# Patient Record
Sex: FEMALE | Race: WHITE | Employment: OTHER | URBAN - METROPOLITAN AREA
[De-identification: names, ages, dates, MRNs, and addresses within clinical notes are randomized per-mention and may not be internally consistent; named-entity substitution may affect disease eponyms.]

---

## 2017-01-12 PROBLEM — G40.909 SEIZURE DISORDER (HCC): Status: ACTIVE | Noted: 2017-01-12

## 2017-02-01 ENCOUNTER — HOSPITAL ENCOUNTER (OUTPATIENT)
Dept: OTHER | Age: 52
Discharge: HOME | End: 2017-02-28
Attending: INTERNAL MEDICINE | Admitting: INTERNAL MEDICINE

## 2017-02-11 LAB — CLOSTRIDIUM DIFFICILE, PCR: NORMAL

## 2017-02-17 LAB
ALBUMIN SERPL-MCNC: 3.8 GM/DL (ref 3.4–5)
ALP BLD-CCNC: 91 IU/L (ref 40–128)
ALT SERPL-CCNC: 12 U/L (ref 10–40)
ANION GAP SERPL CALCULATED.3IONS-SCNC: 16 MMOL/L (ref 4–16)
AST SERPL-CCNC: 14 IU/L (ref 15–37)
BILIRUB SERPL-MCNC: 0.2 MG/DL (ref 0–1)
BUN BLDV-MCNC: 43 MG/DL (ref 6–23)
CALCIUM SERPL-MCNC: 9.5 MG/DL (ref 8.3–10.6)
CHLORIDE BLD-SCNC: 97 MMOL/L (ref 99–110)
CO2: 19 MMOL/L (ref 21–32)
CREAT SERPL-MCNC: 1.1 MG/DL (ref 0.6–1.1)
GFR AFRICAN AMERICAN: >60 ML/MIN/1.73M2
GFR NON-AFRICAN AMERICAN: 52 ML/MIN/1.73M2
GLUCOSE FASTING: 92 MG/DL (ref 70–99)
HCT VFR BLD CALC: 38.7 % (ref 37–47)
HEMOGLOBIN: 11.8 GM/DL (ref 12.5–16)
MCH RBC QN AUTO: 30.2 PG (ref 27–31)
MCHC RBC AUTO-ENTMCNC: 30.5 % (ref 32–36)
MCV RBC AUTO: 99 FL (ref 78–100)
PDW BLD-RTO: 15.9 % (ref 11.7–14.9)
PLATELET # BLD: 308 K/CU MM (ref 140–440)
PMV BLD AUTO: 9.5 FL (ref 7.5–11.1)
POTASSIUM SERPL-SCNC: 5.5 MMOL/L (ref 3.5–5.1)
RBC # BLD: 3.91 M/CU MM (ref 4.2–5.4)
SODIUM BLD-SCNC: 132 MMOL/L (ref 135–145)
TOTAL PROTEIN: 6.9 GM/DL (ref 6.4–8.2)
WBC # BLD: 11.6 K/CU MM (ref 4–10.5)

## 2017-02-21 LAB
ANION GAP SERPL CALCULATED.3IONS-SCNC: 20 MMOL/L (ref 4–16)
BUN BLDV-MCNC: 47 MG/DL (ref 6–23)
CALCIUM SERPL-MCNC: 9.1 MG/DL (ref 8.3–10.6)
CHLORIDE BLD-SCNC: 99 MMOL/L (ref 99–110)
CO2: 19 MMOL/L (ref 21–32)
CREAT SERPL-MCNC: 1.4 MG/DL (ref 0.6–1.1)
GFR AFRICAN AMERICAN: 48 ML/MIN/1.73M2
GFR NON-AFRICAN AMERICAN: 39 ML/MIN/1.73M2
GLUCOSE BLD-MCNC: 79 MG/DL (ref 70–140)
POTASSIUM SERPL-SCNC: 5 MMOL/L (ref 3.5–5.1)
SODIUM BLD-SCNC: 138 MMOL/L (ref 135–145)

## 2017-03-01 ENCOUNTER — HOSPITAL ENCOUNTER (OUTPATIENT)
Dept: OTHER | Age: 52
Discharge: HOME | End: 2017-03-31
Attending: INTERNAL MEDICINE | Admitting: INTERNAL MEDICINE

## 2017-03-13 LAB
ALBUMIN SERPL-MCNC: 3.8 GM/DL (ref 3.4–5)
ALP BLD-CCNC: 124 IU/L (ref 40–128)
ALT SERPL-CCNC: 41 U/L (ref 10–40)
ANION GAP SERPL CALCULATED.3IONS-SCNC: 14 MMOL/L (ref 4–16)
AST SERPL-CCNC: 15 IU/L (ref 15–37)
BILIRUB SERPL-MCNC: 0.2 MG/DL (ref 0–1)
BUN BLDV-MCNC: 19 MG/DL (ref 6–23)
CALCIUM SERPL-MCNC: 8.8 MG/DL (ref 8.3–10.6)
CHLORIDE BLD-SCNC: 101 MMOL/L (ref 99–110)
CO2: 22 MMOL/L (ref 21–32)
CREAT SERPL-MCNC: 0.9 MG/DL (ref 0.6–1.1)
GFR AFRICAN AMERICAN: >60 ML/MIN/1.73M2
GFR NON-AFRICAN AMERICAN: >60 ML/MIN/1.73M2
GLUCOSE BLD-MCNC: 87 MG/DL (ref 70–140)
HCT VFR BLD CALC: 33.4 % (ref 37–47)
HEMOGLOBIN: 10.4 GM/DL (ref 12.5–16)
MCH RBC QN AUTO: 30.6 PG (ref 27–31)
MCHC RBC AUTO-ENTMCNC: 31.1 % (ref 32–36)
MCV RBC AUTO: 98.2 FL (ref 78–100)
PDW BLD-RTO: 15.6 % (ref 11.7–14.9)
PLATELET # BLD: 310 K/CU MM (ref 140–440)
PMV BLD AUTO: 9.2 FL (ref 7.5–11.1)
POTASSIUM SERPL-SCNC: 4.2 MMOL/L (ref 3.5–5.1)
RBC # BLD: 3.4 M/CU MM (ref 4.2–5.4)
SODIUM BLD-SCNC: 137 MMOL/L (ref 135–145)
TOTAL PROTEIN: 6.2 GM/DL (ref 6.4–8.2)
WBC # BLD: 9.2 K/CU MM (ref 4–10.5)

## 2017-04-01 ENCOUNTER — HOSPITAL ENCOUNTER (OUTPATIENT)
Dept: OTHER | Age: 52
Discharge: HOME | End: 2017-04-30
Attending: INTERNAL MEDICINE | Admitting: INTERNAL MEDICINE

## 2017-04-20 LAB
CULTURE: NORMAL
ORGANISM: NORMAL
REPORT STATUS: NORMAL
REQUEST PROBLEM: NORMAL
SPECIMEN: NORMAL
TOTAL COLONY COUNT: NORMAL

## 2017-05-01 ENCOUNTER — HOSPITAL ENCOUNTER (OUTPATIENT)
Dept: OTHER | Age: 52
Discharge: HOME | End: 2017-05-31
Attending: INTERNAL MEDICINE | Admitting: INTERNAL MEDICINE

## 2017-05-12 LAB
ALBUMIN SERPL-MCNC: 3.9 GM/DL (ref 3.4–5)
ALP BLD-CCNC: 161 IU/L (ref 40–128)
ALT SERPL-CCNC: 14 U/L (ref 10–40)
ANION GAP SERPL CALCULATED.3IONS-SCNC: 16 MMOL/L (ref 4–16)
AST SERPL-CCNC: 17 IU/L (ref 15–37)
BILIRUB SERPL-MCNC: 0.2 MG/DL (ref 0–1)
BUN BLDV-MCNC: 27 MG/DL (ref 6–23)
CALCIUM SERPL-MCNC: 9.5 MG/DL (ref 8.3–10.6)
CHLORIDE BLD-SCNC: 102 MMOL/L (ref 99–110)
CO2: 21 MMOL/L (ref 21–32)
CREAT SERPL-MCNC: 1 MG/DL (ref 0.6–1.1)
ESTIMATED AVERAGE GLUCOSE: 100 MG/DL
GFR AFRICAN AMERICAN: >60 ML/MIN/1.73M2
GFR NON-AFRICAN AMERICAN: 58 ML/MIN/1.73M2
GLUCOSE BLD-MCNC: 110 MG/DL (ref 70–140)
HBA1C MFR BLD: 5.1 % (ref 4.2–6.3)
HCT VFR BLD CALC: 40.1 % (ref 37–47)
HEMOGLOBIN: 12 GM/DL (ref 12.5–16)
MCH RBC QN AUTO: 26.8 PG (ref 27–31)
MCHC RBC AUTO-ENTMCNC: 29.9 % (ref 32–36)
MCV RBC AUTO: 89.5 FL (ref 78–100)
PDW BLD-RTO: 14.4 % (ref 11.7–14.9)
PLATELET # BLD: 241 K/CU MM (ref 140–440)
PMV BLD AUTO: 10.3 FL (ref 7.5–11.1)
POTASSIUM SERPL-SCNC: 4.6 MMOL/L (ref 3.5–5.1)
RBC # BLD: 4.48 M/CU MM (ref 4.2–5.4)
SODIUM BLD-SCNC: 139 MMOL/L (ref 135–145)
TOTAL PROTEIN: 7 GM/DL (ref 6.4–8.2)
WBC # BLD: 8 K/CU MM (ref 4–10.5)

## 2017-05-19 LAB
ANION GAP SERPL CALCULATED.3IONS-SCNC: 17 MMOL/L (ref 4–16)
BUN BLDV-MCNC: 29 MG/DL (ref 6–23)
CALCIUM SERPL-MCNC: 9.4 MG/DL (ref 8.3–10.6)
CHLORIDE BLD-SCNC: 103 MMOL/L (ref 99–110)
CO2: 22 MMOL/L (ref 21–32)
CREAT SERPL-MCNC: 0.9 MG/DL (ref 0.6–1.1)
GFR AFRICAN AMERICAN: >60 ML/MIN/1.73M2
GFR NON-AFRICAN AMERICAN: >60 ML/MIN/1.73M2
GLUCOSE BLD-MCNC: 92 MG/DL (ref 70–140)
HEMOGLOBIN: 12.3 GM/DL (ref 12.5–16)
POTASSIUM SERPL-SCNC: 4.3 MMOL/L (ref 3.5–5.1)
SODIUM BLD-SCNC: 142 MMOL/L (ref 135–145)

## 2017-06-01 ENCOUNTER — HOSPITAL ENCOUNTER (OUTPATIENT)
Dept: OTHER | Age: 52
Discharge: HOME | End: 2017-06-30
Attending: INTERNAL MEDICINE | Admitting: INTERNAL MEDICINE

## 2017-06-06 LAB
BACTERIA: ABNORMAL /HPF
BILIRUBIN URINE: NEGATIVE MG/DL
BLOOD, URINE: ABNORMAL
CALCIUM OXALATE CRYSTALS: ABNORMAL /HPF
CLARITY: ABNORMAL
COLOR: ABNORMAL
GLUCOSE, URINE: NEGATIVE MG/DL
HYALINE CASTS: 5 /LPF
KETONES, URINE: NEGATIVE MG/DL
LEUKOCYTE ESTERASE, URINE: ABNORMAL
MUCUS: ABNORMAL HPF
NITRITE URINE, QUANTITATIVE: NEGATIVE
PH, URINE: 6 (ref 5–8)
PROTEIN UA: 30 MG/DL
RBC URINE: 33 /HPF (ref 0–6)
SPECIFIC GRAVITY UA: 1.02 (ref 1–1.03)
SQUAMOUS EPITHELIAL: 2 /HPF
UROBILINOGEN, URINE: NORMAL MG/DL (ref 0.2–1)
WBC CLUMP: ABNORMAL /HPF
WBC UA: 116 /HPF (ref 0–5)
YEAST: ABNORMAL /HPF

## 2017-06-09 LAB
BACTERIA: ABNORMAL /HPF
BILIRUBIN URINE: NEGATIVE MG/DL
BLOOD, URINE: ABNORMAL
CLARITY: CLEAR
COLOR: ABNORMAL
CULTURE: NORMAL
GLUCOSE, URINE: NEGATIVE MG/DL
HYALINE CASTS: 2 /LPF
KETONES, URINE: NEGATIVE MG/DL
LEUKOCYTE ESTERASE, URINE: ABNORMAL
MUCUS: ABNORMAL HPF
NITRITE URINE, QUANTITATIVE: NEGATIVE
PH, URINE: 5 (ref 5–8)
PROTEIN UA: NEGATIVE MG/DL
RBC URINE: 35 /HPF (ref 0–6)
REPORT STATUS: NORMAL
REQUEST PROBLEM: NORMAL
SPECIFIC GRAVITY UA: 1.01 (ref 1–1.03)
SPECIMEN: NORMAL
SQUAMOUS EPITHELIAL: <1 /HPF
TOTAL COLONY COUNT: NORMAL
UROBILINOGEN, URINE: NORMAL MG/DL (ref 0.2–1)
WBC UA: 15 /HPF (ref 0–5)

## 2017-06-13 ENCOUNTER — HOSPITAL ENCOUNTER (OUTPATIENT)
Dept: OTHER | Age: 52
Discharge: HOME | End: 2017-06-13
Attending: UROLOGY | Admitting: UROLOGY

## 2017-06-20 LAB
STONE COMPOSITION: NORMAL
STONE DESCRIPTION: NORMAL
STONE MASS: 7
STONE NUMBER: 2
STONE SIZE: NORMAL

## 2017-06-21 LAB
BACTERIA: ABNORMAL /HPF
BILIRUBIN URINE: NEGATIVE MG/DL
BLOOD, URINE: ABNORMAL
CLARITY: ABNORMAL
COLOR: YELLOW
GLUCOSE, URINE: NEGATIVE MG/DL
KETONES, URINE: NEGATIVE MG/DL
LEUKOCYTE ESTERASE, URINE: ABNORMAL
NITRITE URINE, QUANTITATIVE: POSITIVE
PH, URINE: 6 (ref 5–8)
PROTEIN UA: 100 MG/DL
RBC URINE: ABNORMAL /HPF (ref 0–6)
SPECIFIC GRAVITY UA: 1.02 (ref 1–1.03)
SQUAMOUS EPITHELIAL: 297 /HPF
UROBILINOGEN, URINE: NORMAL MG/DL (ref 0.2–1)
WBC CLUMP: ABNORMAL /HPF
WBC UA: 1320 /HPF (ref 0–5)

## 2017-06-26 LAB
ALBUMIN SERPL-MCNC: 3.5 GM/DL (ref 3.4–5)
ALP BLD-CCNC: 160 IU/L (ref 40–128)
ALT SERPL-CCNC: 14 U/L (ref 10–40)
ANION GAP SERPL CALCULATED.3IONS-SCNC: 15 MMOL/L (ref 4–16)
AST SERPL-CCNC: 15 IU/L (ref 15–37)
BILIRUB SERPL-MCNC: 0.2 MG/DL (ref 0–1)
BUN BLDV-MCNC: 25 MG/DL (ref 6–23)
CALCIUM SERPL-MCNC: 9.1 MG/DL (ref 8.3–10.6)
CHLORIDE BLD-SCNC: 100 MMOL/L (ref 99–110)
CO2: 23 MMOL/L (ref 21–32)
CREAT SERPL-MCNC: 0.9 MG/DL (ref 0.6–1.1)
GFR AFRICAN AMERICAN: >60 ML/MIN/1.73M2
GFR NON-AFRICAN AMERICAN: >60 ML/MIN/1.73M2
GLUCOSE BLD-MCNC: 88 MG/DL (ref 70–140)
HCT VFR BLD CALC: 37.9 % (ref 37–47)
HEMOGLOBIN: 11.6 GM/DL (ref 12.5–16)
MCH RBC QN AUTO: 26.6 PG (ref 27–31)
MCHC RBC AUTO-ENTMCNC: 30.6 % (ref 32–36)
MCV RBC AUTO: 86.9 FL (ref 78–100)
PDW BLD-RTO: 15.8 % (ref 11.7–14.9)
PLATELET # BLD: 203 K/CU MM (ref 140–440)
PMV BLD AUTO: 10.6 FL (ref 7.5–11.1)
POTASSIUM SERPL-SCNC: 4.3 MMOL/L (ref 3.5–5.1)
RBC # BLD: 4.36 M/CU MM (ref 4.2–5.4)
SODIUM BLD-SCNC: 138 MMOL/L (ref 135–145)
TOTAL PROTEIN: 6.5 GM/DL (ref 6.4–8.2)
WBC # BLD: 7.8 K/CU MM (ref 4–10.5)

## 2017-07-01 ENCOUNTER — HOSPITAL ENCOUNTER (OUTPATIENT)
Dept: OTHER | Age: 52
Discharge: HOME | End: 2017-07-31
Attending: INTERNAL MEDICINE | Admitting: INTERNAL MEDICINE

## 2017-07-07 LAB
BACTERIA: ABNORMAL /HPF
BILIRUBIN URINE: NEGATIVE MG/DL
BLOOD, URINE: ABNORMAL
CLARITY: ABNORMAL
COLOR: YELLOW
GLUCOSE, URINE: NEGATIVE MG/DL
KETONES, URINE: NEGATIVE MG/DL
LEUKOCYTE ESTERASE, URINE: ABNORMAL
NITRITE URINE, QUANTITATIVE: POSITIVE
PH, URINE: 6 (ref 5–8)
PROTEIN UA: 30 MG/DL
RBC URINE: ABNORMAL /HPF (ref 0–6)
SPECIFIC GRAVITY UA: 1.02 (ref 1–1.03)
SQUAMOUS EPITHELIAL: 1 /HPF
TRANSITIONAL EPITHELIAL: <1 /HPF
UROBILINOGEN, URINE: NORMAL MG/DL (ref 0.2–1)
WBC CLUMP: ABNORMAL /HPF
WBC UA: 330 /HPF (ref 0–5)

## 2017-07-09 LAB
CULTURE: NORMAL
ORGANISM: NORMAL
ORGANISM: NORMAL
REPORT STATUS: NORMAL
REQUEST PROBLEM: NORMAL
SPECIMEN: NORMAL
TOTAL COLONY COUNT: NORMAL

## 2017-08-01 ENCOUNTER — HOSPITAL ENCOUNTER (OUTPATIENT)
Dept: OTHER | Age: 52
Discharge: HOME | End: 2017-08-31
Attending: INTERNAL MEDICINE | Admitting: INTERNAL MEDICINE

## 2017-08-04 LAB
ANION GAP SERPL CALCULATED.3IONS-SCNC: 15 MMOL/L (ref 4–16)
BUN BLDV-MCNC: 27 MG/DL (ref 6–23)
CALCIUM SERPL-MCNC: 9.2 MG/DL (ref 8.3–10.6)
CHLORIDE BLD-SCNC: 98 MMOL/L (ref 99–110)
CO2: 23 MMOL/L (ref 21–32)
CREAT SERPL-MCNC: 0.9 MG/DL (ref 0.6–1.1)
GFR AFRICAN AMERICAN: >60 ML/MIN/1.73M2
GFR NON-AFRICAN AMERICAN: >60 ML/MIN/1.73M2
GLUCOSE BLD-MCNC: 91 MG/DL (ref 70–140)
HCT VFR BLD CALC: 40.8 % (ref 37–47)
HEMOGLOBIN: 12.7 GM/DL (ref 12.5–16)
MCH RBC QN AUTO: 26.7 PG (ref 27–31)
MCHC RBC AUTO-ENTMCNC: 31.1 % (ref 32–36)
MCV RBC AUTO: 85.7 FL (ref 78–100)
PDW BLD-RTO: 16.7 % (ref 11.7–14.9)
PLATELET # BLD: 243 K/CU MM (ref 140–440)
PMV BLD AUTO: 9.6 FL (ref 7.5–11.1)
POTASSIUM SERPL-SCNC: 4.4 MMOL/L (ref 3.5–5.1)
RBC # BLD: 4.76 M/CU MM (ref 4.2–5.4)
SODIUM BLD-SCNC: 136 MMOL/L (ref 135–145)
WBC # BLD: 8.4 K/CU MM (ref 4–10.5)

## 2017-09-01 ENCOUNTER — HOSPITAL ENCOUNTER (OUTPATIENT)
Dept: OTHER | Age: 52
Discharge: HOME | End: 2017-09-30
Attending: INTERNAL MEDICINE | Admitting: INTERNAL MEDICINE

## 2017-10-25 ENCOUNTER — HOSPITAL ENCOUNTER (OUTPATIENT)
Dept: ULTRASOUND IMAGING | Age: 52
Discharge: HOME | End: 2017-10-25
Attending: UROLOGY | Admitting: UROLOGY

## 2017-10-25 DIAGNOSIS — N20.0 CALCULUS OF KIDNEY: ICD-10-CM

## 2018-01-01 ENCOUNTER — HOSPITAL ENCOUNTER (OUTPATIENT)
Dept: OTHER | Age: 53
Discharge: HOME | End: 2018-01-31
Attending: INTERNAL MEDICINE | Admitting: INTERNAL MEDICINE

## 2018-01-01 ENCOUNTER — HOSPITAL ENCOUNTER (OUTPATIENT)
Age: 53
Setting detail: SPECIMEN
Discharge: HOME | End: 2018-11-26
Payer: MEDICAID

## 2018-01-01 ENCOUNTER — HOSPITAL ENCOUNTER (OUTPATIENT)
Age: 53
Setting detail: SPECIMEN
Discharge: HOME | End: 2018-11-14
Payer: MEDICAID

## 2018-01-01 ENCOUNTER — HOSPITAL ENCOUNTER (OUTPATIENT)
Age: 53
Setting detail: SPECIMEN
Discharge: HOME | End: 2018-11-15
Payer: MEDICAID

## 2018-01-01 ENCOUNTER — HOSPITAL ENCOUNTER (OUTPATIENT)
Age: 53
Setting detail: SPECIMEN
Discharge: HOME | End: 2018-12-03
Payer: MEDICAID

## 2018-01-01 ENCOUNTER — HOSPITAL ENCOUNTER (OUTPATIENT)
Age: 53
Setting detail: SPECIMEN
Discharge: HOME | End: 2018-12-11
Payer: MEDICAID

## 2018-01-01 ENCOUNTER — HOSPITAL ENCOUNTER (OUTPATIENT)
Age: 53
Setting detail: SPECIMEN
Discharge: HOME | End: 2018-11-12
Payer: MEDICAID

## 2018-01-01 LAB
ALBUMIN SERPL-MCNC: 3 GM/DL (ref 3.4–5)
ALBUMIN SERPL-MCNC: 3.6 GM/DL (ref 3.4–5)
ALP BLD-CCNC: 208 IU/L (ref 40–128)
ALP BLD-CCNC: 355 IU/L (ref 40–128)
ALT SERPL-CCNC: 158 U/L (ref 10–40)
ALT SERPL-CCNC: 16 U/L (ref 10–40)
ANION GAP SERPL CALCULATED.3IONS-SCNC: 13 MMOL/L (ref 4–16)
ANION GAP SERPL CALCULATED.3IONS-SCNC: 17 MMOL/L (ref 4–16)
ANION GAP SERPL CALCULATED.3IONS-SCNC: 17 MMOL/L (ref 4–16)
AST SERPL-CCNC: 136 IU/L (ref 15–37)
AST SERPL-CCNC: 16 IU/L (ref 15–37)
BILIRUB SERPL-MCNC: 0.2 MG/DL (ref 0–1)
BILIRUB SERPL-MCNC: 0.2 MG/DL (ref 0–1)
BUN BLDV-MCNC: 18 MG/DL (ref 6–23)
BUN BLDV-MCNC: 20 MG/DL (ref 6–23)
BUN BLDV-MCNC: 33 MG/DL (ref 6–23)
CALCIUM SERPL-MCNC: 8.7 MG/DL (ref 8.3–10.6)
CALCIUM SERPL-MCNC: 8.9 MG/DL (ref 8.3–10.6)
CALCIUM SERPL-MCNC: 9.1 MG/DL (ref 8.3–10.6)
CHLORIDE BLD-SCNC: 101 MMOL/L (ref 99–110)
CHLORIDE BLD-SCNC: 99 MMOL/L (ref 99–110)
CHLORIDE BLD-SCNC: 99 MMOL/L (ref 99–110)
CO2: 18 MMOL/L (ref 21–32)
CO2: 20 MMOL/L (ref 21–32)
CO2: 24 MMOL/L (ref 21–32)
CREAT SERPL-MCNC: 0.8 MG/DL (ref 0.6–1.1)
CREAT SERPL-MCNC: 0.8 MG/DL (ref 0.6–1.1)
CREAT SERPL-MCNC: 1 MG/DL (ref 0.6–1.1)
ESTIMATED AVERAGE GLUCOSE: 111 MG/DL
GFR AFRICAN AMERICAN: >60 ML/MIN/1.73M2
GFR NON-AFRICAN AMERICAN: 58 ML/MIN/1.73M2
GFR NON-AFRICAN AMERICAN: >60 ML/MIN/1.73M2
GFR NON-AFRICAN AMERICAN: >60 ML/MIN/1.73M2
GLUCOSE BLD-MCNC: 86 MG/DL (ref 70–99)
GLUCOSE BLD-MCNC: 89 MG/DL (ref 70–99)
GLUCOSE BLD-MCNC: 99 MG/DL (ref 70–99)
HBA1C MFR BLD: 5.5 % (ref 4.2–6.3)
HCT VFR BLD CALC: 36.5 % (ref 37–47)
HCT VFR BLD CALC: 39 % (ref 37–47)
HEMOCCULT SP1 STL QL: NEGATIVE
HEMOGLOBIN: 10.5 GM/DL (ref 12.5–16)
HEMOGLOBIN: 10.8 GM/DL (ref 12.5–16)
MCH RBC QN AUTO: 25.7 PG (ref 27–31)
MCH RBC QN AUTO: 25.7 PG (ref 27–31)
MCHC RBC AUTO-ENTMCNC: 27.7 % (ref 32–36)
MCHC RBC AUTO-ENTMCNC: 28.8 % (ref 32–36)
MCV RBC AUTO: 89.5 FL (ref 78–100)
MCV RBC AUTO: 92.9 FL (ref 78–100)
OCCULT BLOOD 2: NEGATIVE
PDW BLD-RTO: 16.3 % (ref 11.7–14.9)
PDW BLD-RTO: 16.8 % (ref 11.7–14.9)
PLATELET # BLD: 397 K/CU MM (ref 140–440)
PLATELET # BLD: 476 K/CU MM (ref 140–440)
PMV BLD AUTO: 8.6 FL (ref 7.5–11.1)
PMV BLD AUTO: 9 FL (ref 7.5–11.1)
POTASSIUM SERPL-SCNC: 3.9 MMOL/L (ref 3.5–5.1)
POTASSIUM SERPL-SCNC: 4.3 MMOL/L (ref 3.5–5.1)
POTASSIUM SERPL-SCNC: 4.4 MMOL/L (ref 3.5–5.1)
RBC # BLD: 4.08 M/CU MM (ref 4.2–5.4)
RBC # BLD: 4.2 M/CU MM (ref 4.2–5.4)
SODIUM BLD-SCNC: 136 MMOL/L (ref 135–145)
TOTAL PROTEIN: 7.2 GM/DL (ref 6.4–8.2)
TOTAL PROTEIN: 7.5 GM/DL (ref 6.4–8.2)
WBC # BLD: 10.9 K/CU MM (ref 4–10.5)
WBC # BLD: 9.2 K/CU MM (ref 4–10.5)

## 2018-01-01 PROCEDURE — 36415 COLL VENOUS BLD VENIPUNCTURE: CPT

## 2018-01-01 PROCEDURE — 85027 COMPLETE CBC AUTOMATED: CPT

## 2018-01-01 PROCEDURE — G0328 FECAL BLOOD SCRN IMMUNOASSAY: HCPCS

## 2018-01-01 PROCEDURE — 83036 HEMOGLOBIN GLYCOSYLATED A1C: CPT

## 2018-01-01 PROCEDURE — 80053 COMPREHEN METABOLIC PANEL: CPT

## 2018-01-01 PROCEDURE — 80048 BASIC METABOLIC PNL TOTAL CA: CPT

## 2018-01-05 LAB
RAPID INFLUENZA  B AGN: NEGATIVE
RAPID INFLUENZA A AGN: NEGATIVE

## 2018-02-01 ENCOUNTER — HOSPITAL ENCOUNTER (OUTPATIENT)
Dept: OTHER | Age: 53
Discharge: HOME | End: 2018-02-28
Attending: INTERNAL MEDICINE | Admitting: INTERNAL MEDICINE

## 2018-02-26 LAB
ALBUMIN SERPL-MCNC: 4 GM/DL (ref 3.4–5)
ALP BLD-CCNC: 206 IU/L (ref 40–128)
ALT SERPL-CCNC: 7 U/L (ref 10–40)
ANION GAP SERPL CALCULATED.3IONS-SCNC: 17 MMOL/L (ref 4–16)
AST SERPL-CCNC: 12 IU/L (ref 15–37)
BILIRUB SERPL-MCNC: 0.2 MG/DL (ref 0–1)
BUN BLDV-MCNC: 18 MG/DL (ref 6–23)
CALCIUM SERPL-MCNC: 8.8 MG/DL (ref 8.3–10.6)
CHLORIDE BLD-SCNC: 100 MMOL/L (ref 99–110)
CO2: 21 MMOL/L (ref 21–32)
CREAT SERPL-MCNC: 0.9 MG/DL (ref 0.6–1.1)
ESTIMATED AVERAGE GLUCOSE: 103 MG/DL
GFR AFRICAN AMERICAN: >60 ML/MIN/1.73M2
GFR NON-AFRICAN AMERICAN: >60 ML/MIN/1.73M2
GLUCOSE BLD-MCNC: 82 MG/DL (ref 70–99)
HBA1C MFR BLD: 5.2 % (ref 4.2–6.3)
POTASSIUM SERPL-SCNC: 4.4 MMOL/L (ref 3.5–5.1)
SODIUM BLD-SCNC: 138 MMOL/L (ref 135–145)
TOTAL PROTEIN: 7.5 GM/DL (ref 6.4–8.2)

## 2018-03-01 ENCOUNTER — HOSPITAL ENCOUNTER (OUTPATIENT)
Dept: OTHER | Age: 53
Discharge: HOME | End: 2018-03-31
Attending: INTERNAL MEDICINE | Admitting: INTERNAL MEDICINE

## 2018-05-01 ENCOUNTER — HOSPITAL ENCOUNTER (OUTPATIENT)
Dept: OTHER | Age: 53
Discharge: HOME | End: 2018-05-31
Attending: INTERNAL MEDICINE | Admitting: INTERNAL MEDICINE

## 2018-05-07 ENCOUNTER — HOSPITAL ENCOUNTER (OUTPATIENT)
Dept: GENERAL RADIOLOGY | Age: 53
Discharge: HOME | End: 2018-05-07
Attending: INTERNAL MEDICINE | Admitting: INTERNAL MEDICINE

## 2018-05-07 DIAGNOSIS — R10.811 RIGHT UPPER QUADRANT ABDOMINAL TENDERNESS, REBOUND TENDERNESS PRESENCE NOT SPECIFIED: ICD-10-CM

## 2018-05-11 LAB
ALBUMIN SERPL-MCNC: 3.8 GM/DL (ref 3.4–5)
ALP BLD-CCNC: 217 IU/L (ref 40–128)
ALT SERPL-CCNC: 19 U/L (ref 10–40)
ANION GAP SERPL CALCULATED.3IONS-SCNC: 15 MMOL/L (ref 4–16)
AST SERPL-CCNC: 13 IU/L (ref 15–37)
BILIRUB SERPL-MCNC: 0.2 MG/DL (ref 0–1)
BUN BLDV-MCNC: 16 MG/DL (ref 6–23)
CALCIUM SERPL-MCNC: 9.2 MG/DL (ref 8.3–10.6)
CHLORIDE BLD-SCNC: 101 MMOL/L (ref 99–110)
CO2: 21 MMOL/L (ref 21–32)
CREAT SERPL-MCNC: 0.9 MG/DL (ref 0.6–1.1)
GFR AFRICAN AMERICAN: >60 ML/MIN/1.73M2
GFR NON-AFRICAN AMERICAN: >60 ML/MIN/1.73M2
GLUCOSE BLD-MCNC: 92 MG/DL (ref 70–99)
HCT VFR BLD CALC: 39.8 % (ref 37–47)
HEMOGLOBIN: 12.5 GM/DL (ref 12.5–16)
MCH RBC QN AUTO: 27.7 PG (ref 27–31)
MCHC RBC AUTO-ENTMCNC: 31.4 % (ref 32–36)
MCV RBC AUTO: 88.2 FL (ref 78–100)
PDW BLD-RTO: 17.2 % (ref 11.7–14.9)
PLATELET # BLD: 275 K/CU MM (ref 140–440)
PMV BLD AUTO: 9.5 FL (ref 7.5–11.1)
POTASSIUM SERPL-SCNC: 4.2 MMOL/L (ref 3.5–5.1)
RBC # BLD: 4.51 M/CU MM (ref 4.2–5.4)
SODIUM BLD-SCNC: 137 MMOL/L (ref 135–145)
TOTAL PROTEIN: 7.4 GM/DL (ref 6.4–8.2)
WBC # BLD: 8.5 K/CU MM (ref 4–10.5)

## 2018-06-01 ENCOUNTER — HOSPITAL ENCOUNTER (OUTPATIENT)
Dept: OTHER | Age: 53
Discharge: HOME | End: 2018-06-30
Attending: INTERNAL MEDICINE | Admitting: INTERNAL MEDICINE

## 2018-06-18 LAB
CHOLESTEROL: 125 MG/DL
HDLC SERPL-MCNC: 36 MG/DL
LDL CHOLESTEROL CALCULATED: 56 MG/DL
TRIGL SERPL-MCNC: 167 MG/DL

## 2018-07-01 ENCOUNTER — HOSPITAL ENCOUNTER (OUTPATIENT)
Dept: OTHER | Age: 53
Discharge: HOME | End: 2018-07-31
Attending: INTERNAL MEDICINE | Admitting: INTERNAL MEDICINE

## 2019-01-01 ENCOUNTER — APPOINTMENT (OUTPATIENT)
Dept: CT IMAGING | Age: 54
DRG: 663 | End: 2019-01-01
Payer: MEDICAID

## 2019-01-01 ENCOUNTER — HOSPITAL ENCOUNTER (OUTPATIENT)
Age: 54
Setting detail: SPECIMEN
Discharge: HOME | End: 2019-06-24
Payer: MEDICAID

## 2019-01-01 ENCOUNTER — HOSPITAL ENCOUNTER (INPATIENT)
Age: 54
LOS: 10 days | DRG: 343 | End: 2019-10-10
Attending: FAMILY MEDICINE | Admitting: FAMILY MEDICINE
Payer: COMMERCIAL

## 2019-01-01 ENCOUNTER — APPOINTMENT (OUTPATIENT)
Dept: CT IMAGING | Age: 54
DRG: 720 | End: 2019-01-01
Payer: MEDICAID

## 2019-01-01 ENCOUNTER — HOSPITAL ENCOUNTER (INPATIENT)
Age: 54
LOS: 4 days | Discharge: NURSING, REHAB OR LONG-TERM CARE | DRG: 082 | End: 2019-04-30
Attending: EMERGENCY MEDICINE | Admitting: INTERNAL MEDICINE
Payer: MEDICAID

## 2019-01-01 ENCOUNTER — HOSPITAL ENCOUNTER (OUTPATIENT)
Age: 54
Discharge: HOME | End: 2019-05-15
Payer: MEDICAID

## 2019-01-01 ENCOUNTER — HOSPITAL ENCOUNTER (OUTPATIENT)
Age: 54
Setting detail: SPECIMEN
Discharge: HOME | End: 2019-05-17
Payer: MEDICAID

## 2019-01-01 ENCOUNTER — HOSPITAL ENCOUNTER (OUTPATIENT)
Age: 54
Setting detail: SPECIMEN
Discharge: HOME | End: 2019-06-06
Payer: MEDICAID

## 2019-01-01 ENCOUNTER — HOSPITAL ENCOUNTER (OUTPATIENT)
Age: 54
Setting detail: SPECIMEN
Discharge: HOME | End: 2019-01-08
Payer: MEDICAID

## 2019-01-01 ENCOUNTER — APPOINTMENT (OUTPATIENT)
Dept: ULTRASOUND IMAGING | Age: 54
DRG: 663 | End: 2019-01-01
Payer: MEDICAID

## 2019-01-01 ENCOUNTER — HOSPITAL ENCOUNTER (INPATIENT)
Age: 54
LOS: 2 days | Discharge: NURSING, REHAB OR LONG-TERM CARE | DRG: 720 | End: 2019-03-29
Attending: EMERGENCY MEDICINE | Admitting: INTERNAL MEDICINE
Payer: MEDICAID

## 2019-01-01 ENCOUNTER — HOSPITAL ENCOUNTER (OUTPATIENT)
Age: 54
Setting detail: SPECIMEN
Discharge: HOME | End: 2019-08-05
Payer: MEDICAID

## 2019-01-01 ENCOUNTER — APPOINTMENT (OUTPATIENT)
Dept: CT IMAGING | Age: 54
DRG: 082 | End: 2019-01-01
Payer: MEDICAID

## 2019-01-01 ENCOUNTER — HOSPITAL ENCOUNTER (OUTPATIENT)
Age: 54
Setting detail: SPECIMEN
Discharge: HOME | End: 2019-08-29
Payer: MEDICAID

## 2019-01-01 ENCOUNTER — HOSPITAL ENCOUNTER (OUTPATIENT)
Age: 54
Setting detail: SPECIMEN
Discharge: HOME | End: 2019-04-02
Payer: MEDICAID

## 2019-01-01 ENCOUNTER — HOSPITAL ENCOUNTER (OUTPATIENT)
Age: 54
Setting detail: SPECIMEN
Discharge: HOME | End: 2019-05-07
Payer: MEDICAID

## 2019-01-01 ENCOUNTER — HOSPITAL ENCOUNTER (OUTPATIENT)
Age: 54
Setting detail: SPECIMEN
Discharge: HOME | End: 2019-06-05
Payer: MEDICAID

## 2019-01-01 ENCOUNTER — APPOINTMENT (OUTPATIENT)
Dept: GENERAL RADIOLOGY | Age: 54
DRG: 720 | End: 2019-01-01
Payer: MEDICAID

## 2019-01-01 ENCOUNTER — HOSPITAL ENCOUNTER (OUTPATIENT)
Age: 54
Setting detail: SPECIMEN
Discharge: HOME | End: 2019-06-12
Payer: MEDICAID

## 2019-01-01 ENCOUNTER — HOSPITAL ENCOUNTER (OUTPATIENT)
Age: 54
Setting detail: SPECIMEN
Discharge: HOME | End: 2019-05-20
Payer: MEDICAID

## 2019-01-01 ENCOUNTER — HOSPITAL ENCOUNTER (OUTPATIENT)
Age: 54
Setting detail: SPECIMEN
Discharge: HOME | End: 2019-05-29
Payer: MEDICAID

## 2019-01-01 ENCOUNTER — HOSPITAL ENCOUNTER (OUTPATIENT)
Age: 54
Discharge: HOME | End: 2019-06-28
Payer: MEDICAID

## 2019-01-01 ENCOUNTER — HOSPITAL ENCOUNTER (OUTPATIENT)
Age: 54
Setting detail: SPECIMEN
Discharge: HOME | End: 2019-02-11
Payer: MEDICAID

## 2019-01-01 ENCOUNTER — HOSPITAL ENCOUNTER (OUTPATIENT)
Age: 54
Setting detail: SPECIMEN
Discharge: HOME | End: 2019-08-06
Payer: MEDICAID

## 2019-01-01 ENCOUNTER — HOSPITAL ENCOUNTER (INPATIENT)
Age: 54
LOS: 3 days | Discharge: HOSPICE | DRG: 663 | End: 2019-09-30
Attending: EMERGENCY MEDICINE | Admitting: STUDENT IN AN ORGANIZED HEALTH CARE EDUCATION/TRAINING PROGRAM
Payer: MEDICAID

## 2019-01-01 ENCOUNTER — OUTSIDE SERVICES (OUTPATIENT)
Dept: WOUND CARE | Age: 54
End: 2019-01-01
Payer: MEDICAID

## 2019-01-01 ENCOUNTER — HOSPITAL ENCOUNTER (OUTPATIENT)
Age: 54
Setting detail: SPECIMEN
Discharge: HOME | End: 2019-06-18
Payer: MEDICAID

## 2019-01-01 ENCOUNTER — HOSPITAL ENCOUNTER (OUTPATIENT)
Age: 54
Setting detail: SPECIMEN
Discharge: HOME | End: 2019-05-03
Payer: MEDICAID

## 2019-01-01 ENCOUNTER — HOSPITAL ENCOUNTER (OUTPATIENT)
Age: 54
Setting detail: SPECIMEN
Discharge: HOME | End: 2019-06-17
Payer: MEDICAID

## 2019-01-01 VITALS
DIASTOLIC BLOOD PRESSURE: 77 MMHG | WEIGHT: 157.6 LBS | TEMPERATURE: 99.8 F | HEART RATE: 155 BPM | HEIGHT: 60 IN | RESPIRATION RATE: 14 BRPM | SYSTOLIC BLOOD PRESSURE: 132 MMHG | BODY MASS INDEX: 30.94 KG/M2 | OXYGEN SATURATION: 90 %

## 2019-01-01 VITALS
HEART RATE: 83 BPM | WEIGHT: 176 LBS | BODY MASS INDEX: 34.55 KG/M2 | OXYGEN SATURATION: 96 % | RESPIRATION RATE: 16 BRPM | TEMPERATURE: 98.2 F | HEIGHT: 60 IN | DIASTOLIC BLOOD PRESSURE: 73 MMHG | SYSTOLIC BLOOD PRESSURE: 126 MMHG

## 2019-01-01 VITALS
BODY MASS INDEX: 33.12 KG/M2 | TEMPERATURE: 99.1 F | OXYGEN SATURATION: 98 % | HEIGHT: 60 IN | HEART RATE: 82 BPM | RESPIRATION RATE: 18 BRPM | WEIGHT: 168.7 LBS | DIASTOLIC BLOOD PRESSURE: 94 MMHG | SYSTOLIC BLOOD PRESSURE: 138 MMHG

## 2019-01-01 VITALS
RESPIRATION RATE: 12 BRPM | OXYGEN SATURATION: 96 % | HEART RATE: 100 BPM | SYSTOLIC BLOOD PRESSURE: 108 MMHG | WEIGHT: 160.27 LBS | DIASTOLIC BLOOD PRESSURE: 69 MMHG | BODY MASS INDEX: 31.47 KG/M2 | HEIGHT: 60 IN | TEMPERATURE: 97.9 F

## 2019-01-01 DIAGNOSIS — R00.0 TACHYCARDIA: ICD-10-CM

## 2019-01-01 DIAGNOSIS — L03.211 CELLULITIS OF FACE: ICD-10-CM

## 2019-01-01 DIAGNOSIS — B02.30 HERPES ZOSTER WITH OPHTHALMIC COMPLICATION, UNSPECIFIED HERPES ZOSTER EYE DISEASE: Primary | ICD-10-CM

## 2019-01-01 DIAGNOSIS — D72.829 LEUKOCYTOSIS, UNSPECIFIED TYPE: ICD-10-CM

## 2019-01-01 DIAGNOSIS — N39.0 SEPSIS DUE TO URINARY TRACT INFECTION (HCC): Primary | ICD-10-CM

## 2019-01-01 DIAGNOSIS — A41.9 SEPSIS DUE TO URINARY TRACT INFECTION (HCC): Primary | ICD-10-CM

## 2019-01-01 DIAGNOSIS — D64.9 ANEMIA, UNSPECIFIED TYPE: Primary | ICD-10-CM

## 2019-01-01 DIAGNOSIS — M79.605 BILATERAL LEG PAIN: ICD-10-CM

## 2019-01-01 DIAGNOSIS — R52 BODY ACHES: ICD-10-CM

## 2019-01-01 DIAGNOSIS — L03.213 PRESEPTAL CELLULITIS: ICD-10-CM

## 2019-01-01 DIAGNOSIS — B36.9 DERMATITIS FUNGAL: ICD-10-CM

## 2019-01-01 DIAGNOSIS — M79.604 BILATERAL LEG PAIN: ICD-10-CM

## 2019-01-01 DIAGNOSIS — B35.4 TINEA OF THE BODY: Primary | ICD-10-CM

## 2019-01-01 DIAGNOSIS — E87.6 HYPOKALEMIA: ICD-10-CM

## 2019-01-01 LAB
ABO/RH: NORMAL
ALBUMIN SERPL-MCNC: 2.1 GM/DL (ref 3.4–5)
ALBUMIN SERPL-MCNC: 2.5 GM/DL (ref 3.4–5)
ALBUMIN SERPL-MCNC: 3.2 GM/DL (ref 3.4–5)
ALBUMIN SERPL-MCNC: 3.2 GM/DL (ref 3.4–5)
ALBUMIN SERPL-MCNC: 3.3 GM/DL (ref 3.4–5)
ALBUMIN SERPL-MCNC: 3.4 GM/DL (ref 3.4–5)
ALBUMIN SERPL-MCNC: 3.7 GM/DL (ref 3.4–5)
ALP BLD-CCNC: 145 IU/L (ref 40–128)
ALP BLD-CCNC: 171 IU/L (ref 40–128)
ALP BLD-CCNC: 175 IU/L (ref 40–129)
ALP BLD-CCNC: 192 IU/L (ref 40–128)
ALP BLD-CCNC: 192 IU/L (ref 40–129)
ALP BLD-CCNC: 211 IU/L (ref 40–128)
ALP BLD-CCNC: 260 IU/L (ref 40–128)
ALP BLD-CCNC: 294 IU/L (ref 40–129)
ALP BLD-CCNC: 473 IU/L (ref 40–128)
ALT SERPL-CCNC: 11 U/L (ref 10–40)
ALT SERPL-CCNC: 12 U/L (ref 10–40)
ALT SERPL-CCNC: 16 U/L (ref 10–40)
ALT SERPL-CCNC: 18 U/L (ref 10–40)
ALT SERPL-CCNC: 21 U/L (ref 10–40)
ALT SERPL-CCNC: 28 U/L (ref 10–40)
ALT SERPL-CCNC: 58 U/L (ref 10–40)
ALT SERPL-CCNC: 6 U/L (ref 10–40)
ALT SERPL-CCNC: 6 U/L (ref 10–40)
AMMONIA: 32 UMOL/L (ref 11–51)
AMYLASE: 43 U/L (ref 25–115)
ANION GAP SERPL CALCULATED.3IONS-SCNC: 10 MMOL/L (ref 4–16)
ANION GAP SERPL CALCULATED.3IONS-SCNC: 11 MMOL/L (ref 4–16)
ANION GAP SERPL CALCULATED.3IONS-SCNC: 12 MMOL/L (ref 4–16)
ANION GAP SERPL CALCULATED.3IONS-SCNC: 13 MMOL/L (ref 4–16)
ANION GAP SERPL CALCULATED.3IONS-SCNC: 14 MMOL/L (ref 4–16)
ANION GAP SERPL CALCULATED.3IONS-SCNC: 15 MMOL/L (ref 4–16)
ANION GAP SERPL CALCULATED.3IONS-SCNC: 17 MMOL/L (ref 4–16)
ANION GAP SERPL CALCULATED.3IONS-SCNC: 18 MMOL/L (ref 4–16)
ANION GAP SERPL CALCULATED.3IONS-SCNC: 21 MMOL/L (ref 4–16)
ANION GAP SERPL CALCULATED.3IONS-SCNC: 27 MMOL/L (ref 4–16)
ANTI-NUCLEAR ANTIBODY (ANA): NORMAL
ANTIBODY SCREEN: NEGATIVE
AST SERPL-CCNC: 11 IU/L (ref 15–37)
AST SERPL-CCNC: 13 IU/L (ref 15–37)
AST SERPL-CCNC: 15 IU/L (ref 15–37)
AST SERPL-CCNC: 15 IU/L (ref 15–37)
AST SERPL-CCNC: 18 IU/L (ref 15–37)
AST SERPL-CCNC: 22 IU/L (ref 15–37)
AST SERPL-CCNC: 25 IU/L (ref 15–37)
AST SERPL-CCNC: 31 IU/L (ref 15–37)
AST SERPL-CCNC: 42 IU/L (ref 15–37)
BACTERIA: ABNORMAL /HPF
BACTERIA: ABNORMAL /HPF
BACTERIA: NEGATIVE /HPF
BASOPHILS ABSOLUTE: 0 K/CU MM
BASOPHILS RELATIVE PERCENT: 0.1 % (ref 0–1)
BASOPHILS RELATIVE PERCENT: 0.2 % (ref 0–1)
BASOPHILS RELATIVE PERCENT: 0.3 % (ref 0–1)
BILIRUB SERPL-MCNC: 0.2 MG/DL (ref 0–1)
BILIRUB SERPL-MCNC: 0.3 MG/DL (ref 0–1)
BILIRUBIN URINE: NEGATIVE MG/DL
BLOOD, URINE: ABNORMAL
BUN BLDV-MCNC: 11 MG/DL (ref 6–23)
BUN BLDV-MCNC: 12 MG/DL (ref 6–23)
BUN BLDV-MCNC: 14 MG/DL (ref 6–23)
BUN BLDV-MCNC: 14 MG/DL (ref 6–23)
BUN BLDV-MCNC: 15 MG/DL (ref 6–23)
BUN BLDV-MCNC: 15 MG/DL (ref 6–23)
BUN BLDV-MCNC: 16 MG/DL (ref 6–23)
BUN BLDV-MCNC: 17 MG/DL (ref 6–23)
BUN BLDV-MCNC: 18 MG/DL (ref 6–23)
BUN BLDV-MCNC: 18 MG/DL (ref 6–23)
BUN BLDV-MCNC: 19 MG/DL (ref 6–23)
BUN BLDV-MCNC: 21 MG/DL (ref 6–23)
BUN BLDV-MCNC: 21 MG/DL (ref 6–23)
BUN BLDV-MCNC: 24 MG/DL (ref 6–23)
BUN BLDV-MCNC: 24 MG/DL (ref 6–23)
BUN BLDV-MCNC: 25 MG/DL (ref 6–23)
BUN BLDV-MCNC: 27 MG/DL (ref 6–23)
BUN BLDV-MCNC: 28 MG/DL (ref 6–23)
C-REACTIVE PROTEIN, HIGH SENSITIVITY: 156.2 MG/L
C-REACTIVE PROTEIN, HIGH SENSITIVITY: 185.9 MG/L
C-REACTIVE PROTEIN, HIGH SENSITIVITY: 19.9 MG/L
C-REACTIVE PROTEIN, HIGH SENSITIVITY: 39.4 MG/L
C-REACTIVE PROTEIN, HIGH SENSITIVITY: 58 MG/L
C-REACTIVE PROTEIN, HIGH SENSITIVITY: 67.3 MG/L
C-REACTIVE PROTEIN, HIGH SENSITIVITY: >300 MG/L
CALCIUM SERPL-MCNC: 10.1 MG/DL (ref 8.3–10.6)
CALCIUM SERPL-MCNC: 8.3 MG/DL (ref 8.3–10.6)
CALCIUM SERPL-MCNC: 8.4 MG/DL (ref 8.3–10.6)
CALCIUM SERPL-MCNC: 8.5 MG/DL (ref 8.3–10.6)
CALCIUM SERPL-MCNC: 8.6 MG/DL (ref 8.3–10.6)
CALCIUM SERPL-MCNC: 8.8 MG/DL (ref 8.3–10.6)
CALCIUM SERPL-MCNC: 8.9 MG/DL (ref 8.3–10.6)
CALCIUM SERPL-MCNC: 8.9 MG/DL (ref 8.3–10.6)
CALCIUM SERPL-MCNC: 9 MG/DL (ref 8.3–10.6)
CALCIUM SERPL-MCNC: 9.2 MG/DL (ref 8.3–10.6)
CALCIUM SERPL-MCNC: 9.2 MG/DL (ref 8.3–10.6)
CALCIUM SERPL-MCNC: 9.3 MG/DL (ref 8.3–10.6)
CALCIUM SERPL-MCNC: 9.3 MG/DL (ref 8.3–10.6)
CALCIUM SERPL-MCNC: 9.8 MG/DL (ref 8.3–10.6)
CHLORIDE BLD-SCNC: 100 MMOL/L (ref 99–110)
CHLORIDE BLD-SCNC: 100 MMOL/L (ref 99–110)
CHLORIDE BLD-SCNC: 102 MMOL/L (ref 99–110)
CHLORIDE BLD-SCNC: 102 MMOL/L (ref 99–110)
CHLORIDE BLD-SCNC: 104 MMOL/L (ref 99–110)
CHLORIDE BLD-SCNC: 104 MMOL/L (ref 99–110)
CHLORIDE BLD-SCNC: 93 MMOL/L (ref 99–110)
CHLORIDE BLD-SCNC: 94 MMOL/L (ref 99–110)
CHLORIDE BLD-SCNC: 95 MMOL/L (ref 99–110)
CHLORIDE BLD-SCNC: 96 MMOL/L (ref 99–110)
CHLORIDE BLD-SCNC: 98 MMOL/L (ref 99–110)
CHLORIDE BLD-SCNC: 99 MMOL/L (ref 99–110)
CHLORIDE BLD-SCNC: 99 MMOL/L (ref 99–110)
CHOLESTEROL: 120 MG/DL
CHOLESTEROL: 161 MG/DL
CHOLESTEROL: 194 MG/DL
CLARITY: ABNORMAL
CO2: 15 MMOL/L (ref 21–32)
CO2: 17 MMOL/L (ref 21–32)
CO2: 18 MMOL/L (ref 21–32)
CO2: 19 MMOL/L (ref 21–32)
CO2: 20 MMOL/L (ref 21–32)
CO2: 22 MMOL/L (ref 21–32)
CO2: 23 MMOL/L (ref 21–32)
CO2: 24 MMOL/L (ref 21–32)
COLOR: ABNORMAL
COLOR: YELLOW
COLOR: YELLOW
COMPONENT: NORMAL
CREAT SERPL-MCNC: 0.6 MG/DL (ref 0.6–1.1)
CREAT SERPL-MCNC: 0.7 MG/DL (ref 0.6–1.1)
CREAT SERPL-MCNC: 0.8 MG/DL (ref 0.6–1.1)
CREAT SERPL-MCNC: 0.9 MG/DL (ref 0.6–1.1)
CREAT SERPL-MCNC: 1 MG/DL (ref 0.6–1.1)
CREAT SERPL-MCNC: 1.2 MG/DL (ref 0.6–1.1)
CROSSMATCH RESULT: NORMAL
CULTURE: ABNORMAL
CULTURE: NORMAL
DIFFERENTIAL TYPE: ABNORMAL
DOSE AMOUNT: ABNORMAL
DOSE AMOUNT: NORMAL
DOSE TIME: ABNORMAL
DOSE TIME: NORMAL
EKG ATRIAL RATE: 110 BPM
EKG ATRIAL RATE: 119 BPM
EKG DIAGNOSIS: NORMAL
EKG DIAGNOSIS: NORMAL
EKG P AXIS: 26 DEGREES
EKG P AXIS: 41 DEGREES
EKG P-R INTERVAL: 128 MS
EKG P-R INTERVAL: 134 MS
EKG Q-T INTERVAL: 296 MS
EKG Q-T INTERVAL: 330 MS
EKG QRS DURATION: 80 MS
EKG QRS DURATION: 88 MS
EKG QTC CALCULATION (BAZETT): 400 MS
EKG QTC CALCULATION (BAZETT): 464 MS
EKG R AXIS: -20 DEGREES
EKG R AXIS: -27 DEGREES
EKG T AXIS: 50 DEGREES
EKG T AXIS: 87 DEGREES
EKG VENTRICULAR RATE: 110 BPM
EKG VENTRICULAR RATE: 119 BPM
EOSINOPHILS ABSOLUTE: 0 K/CU MM
EOSINOPHILS ABSOLUTE: 0.1 K/CU MM
EOSINOPHILS ABSOLUTE: 0.2 K/CU MM
EOSINOPHILS RELATIVE PERCENT: 0 % (ref 0–3)
EOSINOPHILS RELATIVE PERCENT: 0.3 % (ref 0–3)
EOSINOPHILS RELATIVE PERCENT: 0.7 % (ref 0–3)
EOSINOPHILS RELATIVE PERCENT: 0.9 % (ref 0–3)
EOSINOPHILS RELATIVE PERCENT: 1 % (ref 0–3)
EOSINOPHILS RELATIVE PERCENT: 1.1 % (ref 0–3)
EOSINOPHILS RELATIVE PERCENT: 1.5 % (ref 0–3)
EOSINOPHILS RELATIVE PERCENT: 1.7 % (ref 0–3)
EOSINOPHILS RELATIVE PERCENT: 1.8 % (ref 0–3)
EOSINOPHILS RELATIVE PERCENT: 1.9 % (ref 0–3)
EOSINOPHILS RELATIVE PERCENT: 2.2 % (ref 0–3)
EOSINOPHILS RELATIVE PERCENT: 2.8 % (ref 0–3)
ERYTHROCYTE SEDIMENTATION RATE: 107 MM/HR (ref 0–30)
ERYTHROCYTE SEDIMENTATION RATE: 110 MM/HR (ref 0–30)
ERYTHROCYTE SEDIMENTATION RATE: 111 MM/HR (ref 0–30)
ERYTHROCYTE SEDIMENTATION RATE: 112 MM/HR (ref 0–30)
ERYTHROCYTE SEDIMENTATION RATE: >120 MM/HR (ref 0–30)
FERRITIN: 104 NG/ML (ref 15–150)
FERRITIN: 597 NG/ML (ref 15–150)
GAMMA GLUTAMYL TRANSFERASE: 183 IU/L (ref 5–36)
GAMMA GLUTAMYL TRANSFERASE: 82 IU/L (ref 5–36)
GFR AFRICAN AMERICAN: 56 ML/MIN/1.73M2
GFR AFRICAN AMERICAN: >60 ML/MIN/1.73M2
GFR NON-AFRICAN AMERICAN: 47 ML/MIN/1.73M2
GFR NON-AFRICAN AMERICAN: 58 ML/MIN/1.73M2
GFR NON-AFRICAN AMERICAN: >60 ML/MIN/1.73M2
GLUCOSE BLD-MCNC: 101 MG/DL (ref 70–99)
GLUCOSE BLD-MCNC: 102 MG/DL (ref 70–99)
GLUCOSE BLD-MCNC: 102 MG/DL (ref 70–99)
GLUCOSE BLD-MCNC: 103 MG/DL (ref 70–99)
GLUCOSE BLD-MCNC: 103 MG/DL (ref 70–99)
GLUCOSE BLD-MCNC: 104 MG/DL (ref 70–99)
GLUCOSE BLD-MCNC: 104 MG/DL (ref 70–99)
GLUCOSE BLD-MCNC: 105 MG/DL (ref 70–99)
GLUCOSE BLD-MCNC: 106 MG/DL (ref 70–99)
GLUCOSE BLD-MCNC: 107 MG/DL (ref 70–99)
GLUCOSE BLD-MCNC: 108 MG/DL (ref 70–99)
GLUCOSE BLD-MCNC: 108 MG/DL (ref 70–99)
GLUCOSE BLD-MCNC: 109 MG/DL (ref 70–99)
GLUCOSE BLD-MCNC: 109 MG/DL (ref 70–99)
GLUCOSE BLD-MCNC: 110 MG/DL (ref 70–99)
GLUCOSE BLD-MCNC: 110 MG/DL (ref 70–99)
GLUCOSE BLD-MCNC: 111 MG/DL (ref 70–99)
GLUCOSE BLD-MCNC: 112 MG/DL (ref 70–99)
GLUCOSE BLD-MCNC: 112 MG/DL (ref 70–99)
GLUCOSE BLD-MCNC: 113 MG/DL (ref 70–99)
GLUCOSE BLD-MCNC: 113 MG/DL (ref 70–99)
GLUCOSE BLD-MCNC: 114 MG/DL (ref 70–99)
GLUCOSE BLD-MCNC: 114 MG/DL (ref 70–99)
GLUCOSE BLD-MCNC: 115 MG/DL (ref 70–99)
GLUCOSE BLD-MCNC: 116 MG/DL (ref 70–99)
GLUCOSE BLD-MCNC: 116 MG/DL (ref 70–99)
GLUCOSE BLD-MCNC: 117 MG/DL (ref 70–99)
GLUCOSE BLD-MCNC: 119 MG/DL (ref 70–99)
GLUCOSE BLD-MCNC: 125 MG/DL (ref 70–99)
GLUCOSE BLD-MCNC: 126 MG/DL (ref 70–99)
GLUCOSE BLD-MCNC: 127 MG/DL (ref 70–99)
GLUCOSE BLD-MCNC: 127 MG/DL (ref 70–99)
GLUCOSE BLD-MCNC: 128 MG/DL (ref 70–99)
GLUCOSE BLD-MCNC: 128 MG/DL (ref 70–99)
GLUCOSE BLD-MCNC: 133 MG/DL (ref 70–99)
GLUCOSE BLD-MCNC: 134 MG/DL (ref 70–99)
GLUCOSE BLD-MCNC: 135 MG/DL (ref 70–99)
GLUCOSE BLD-MCNC: 139 MG/DL (ref 70–99)
GLUCOSE BLD-MCNC: 142 MG/DL (ref 70–99)
GLUCOSE BLD-MCNC: 150 MG/DL (ref 70–99)
GLUCOSE BLD-MCNC: 152 MG/DL (ref 70–99)
GLUCOSE BLD-MCNC: 57 MG/DL (ref 70–99)
GLUCOSE BLD-MCNC: 79 MG/DL (ref 70–99)
GLUCOSE BLD-MCNC: 85 MG/DL (ref 70–99)
GLUCOSE BLD-MCNC: 87 MG/DL (ref 70–99)
GLUCOSE BLD-MCNC: 88 MG/DL (ref 70–99)
GLUCOSE BLD-MCNC: 89 MG/DL (ref 70–99)
GLUCOSE BLD-MCNC: 90 MG/DL (ref 70–99)
GLUCOSE BLD-MCNC: 91 MG/DL (ref 70–99)
GLUCOSE BLD-MCNC: 91 MG/DL (ref 70–99)
GLUCOSE BLD-MCNC: 94 MG/DL (ref 70–99)
GLUCOSE BLD-MCNC: 96 MG/DL (ref 70–99)
GLUCOSE BLD-MCNC: 97 MG/DL (ref 70–99)
GLUCOSE, URINE: NEGATIVE MG/DL
HAV IGM SER IA-ACNC: NON REACTIVE
HAV IGM SER IA-ACNC: NON REACTIVE
HCT VFR BLD CALC: 22.9 % (ref 37–47)
HCT VFR BLD CALC: 27.1 % (ref 37–47)
HCT VFR BLD CALC: 27.4 % (ref 37–47)
HCT VFR BLD CALC: 27.7 % (ref 37–47)
HCT VFR BLD CALC: 28 % (ref 37–47)
HCT VFR BLD CALC: 28.6 % (ref 37–47)
HCT VFR BLD CALC: 28.8 % (ref 37–47)
HCT VFR BLD CALC: 29.8 % (ref 37–47)
HCT VFR BLD CALC: 31 % (ref 37–47)
HCT VFR BLD CALC: 31.1 % (ref 37–47)
HCT VFR BLD CALC: 31.8 % (ref 37–47)
HCT VFR BLD CALC: 32.3 % (ref 37–47)
HCT VFR BLD CALC: 32.6 % (ref 37–47)
HCT VFR BLD CALC: 34.3 % (ref 37–47)
HCT VFR BLD CALC: 34.5 % (ref 37–47)
HCT VFR BLD CALC: 34.7 % (ref 37–47)
HCT VFR BLD CALC: 34.8 % (ref 37–47)
HCT VFR BLD CALC: 37.5 % (ref 37–47)
HDLC SERPL-MCNC: 32 MG/DL
HDLC SERPL-MCNC: 40 MG/DL
HDLC SERPL-MCNC: 59 MG/DL
HEMOGLOBIN: 10 GM/DL (ref 12.5–16)
HEMOGLOBIN: 10.1 GM/DL (ref 12.5–16)
HEMOGLOBIN: 10.1 GM/DL (ref 12.5–16)
HEMOGLOBIN: 10.4 GM/DL (ref 12.5–16)
HEMOGLOBIN: 7.2 GM/DL (ref 12.5–16)
HEMOGLOBIN: 7.8 GM/DL (ref 12.5–16)
HEMOGLOBIN: 7.9 GM/DL (ref 12.5–16)
HEMOGLOBIN: 8 GM/DL (ref 12.5–16)
HEMOGLOBIN: 8.1 GM/DL (ref 12.5–16)
HEMOGLOBIN: 8.3 GM/DL (ref 12.5–16)
HEMOGLOBIN: 8.3 GM/DL (ref 12.5–16)
HEMOGLOBIN: 8.8 GM/DL (ref 12.5–16)
HEMOGLOBIN: 9 GM/DL (ref 12.5–16)
HEMOGLOBIN: 9.1 GM/DL (ref 12.5–16)
HEMOGLOBIN: 9.3 GM/DL (ref 12.5–16)
HEMOGLOBIN: 9.6 GM/DL (ref 12.5–16)
HEMOGLOBIN: 9.9 GM/DL (ref 12.5–16)
HEMOGLOBIN: ABNORMAL GM/DL (ref 12.5–16)
HEPATITIS B CORE IGM ANTIBODY: NON REACTIVE
HEPATITIS B CORE IGM ANTIBODY: NON REACTIVE
HEPATITIS B SURFACE ANTIGEN: NON REACTIVE
HEPATITIS B SURFACE ANTIGEN: NON REACTIVE
HEPATITIS C ANTIBODY: NON REACTIVE
HEPATITIS C ANTIBODY: NON REACTIVE
HYPOCHROMIA: ABNORMAL
IMMATURE NEUTROPHIL %: 0.3 % (ref 0–0.43)
IMMATURE NEUTROPHIL %: 0.3 % (ref 0–0.43)
IMMATURE NEUTROPHIL %: 0.4 % (ref 0–0.43)
IMMATURE NEUTROPHIL %: 0.5 % (ref 0–0.43)
IMMATURE NEUTROPHIL %: 0.6 % (ref 0–0.43)
IMMATURE NEUTROPHIL %: 0.7 % (ref 0–0.43)
IMMATURE NEUTROPHIL %: 1.2 % (ref 0–0.43)
IMMATURE NEUTROPHIL %: 1.2 % (ref 0–0.43)
IMMATURE NEUTROPHIL %: 1.4 % (ref 0–0.43)
IRON: 11 UG/DL (ref 37–145)
IRON: 11 UG/DL (ref 37–145)
KETONES, URINE: NEGATIVE MG/DL
LACTATE: 0.5 MMOL/L (ref 0.4–2)
LACTATE: 0.5 MMOL/L (ref 0.4–2)
LACTATE: 0.9 MMOL/L (ref 0.4–2)
LACTATE: 1.1 MMOL/L (ref 0.4–2)
LDL CHOLESTEROL DIRECT: 117 MG/DL
LDL CHOLESTEROL DIRECT: 118 MG/DL
LDL CHOLESTEROL DIRECT: 36 MG/DL
LEUKOCYTE ESTERASE, URINE: ABNORMAL
LIPASE: 14 IU/L (ref 13–60)
LIPASE: 14 IU/L (ref 13–60)
LYMPHOCYTES ABSOLUTE: 1.6 K/CU MM
LYMPHOCYTES ABSOLUTE: 1.9 K/CU MM
LYMPHOCYTES ABSOLUTE: 2 K/CU MM
LYMPHOCYTES ABSOLUTE: 2.4 K/CU MM
LYMPHOCYTES ABSOLUTE: 2.7 K/CU MM
LYMPHOCYTES ABSOLUTE: 2.8 K/CU MM
LYMPHOCYTES ABSOLUTE: 3 K/CU MM
LYMPHOCYTES ABSOLUTE: 3.2 K/CU MM
LYMPHOCYTES ABSOLUTE: 3.4 K/CU MM
LYMPHOCYTES ABSOLUTE: 3.6 K/CU MM
LYMPHOCYTES RELATIVE PERCENT: 19 % (ref 24–44)
LYMPHOCYTES RELATIVE PERCENT: 19.7 % (ref 24–44)
LYMPHOCYTES RELATIVE PERCENT: 22.5 % (ref 24–44)
LYMPHOCYTES RELATIVE PERCENT: 22.8 % (ref 24–44)
LYMPHOCYTES RELATIVE PERCENT: 24.3 % (ref 24–44)
LYMPHOCYTES RELATIVE PERCENT: 24.5 % (ref 24–44)
LYMPHOCYTES RELATIVE PERCENT: 30.7 % (ref 24–44)
LYMPHOCYTES RELATIVE PERCENT: 31 % (ref 24–44)
LYMPHOCYTES RELATIVE PERCENT: 32 % (ref 24–44)
LYMPHOCYTES RELATIVE PERCENT: 32.7 % (ref 24–44)
LYMPHOCYTES RELATIVE PERCENT: 33.7 % (ref 24–44)
LYMPHOCYTES RELATIVE PERCENT: 36.8 % (ref 24–44)
Lab: ABNORMAL
Lab: NORMAL
MAGNESIUM: 1.7 MG/DL (ref 1.8–2.4)
MAGNESIUM: 2.4 MG/DL (ref 1.8–2.4)
MCH RBC QN AUTO: 22.2 PG (ref 27–31)
MCH RBC QN AUTO: 22.3 PG (ref 27–31)
MCH RBC QN AUTO: 23.2 PG (ref 27–31)
MCH RBC QN AUTO: 23.7 PG (ref 27–31)
MCH RBC QN AUTO: 24 PG (ref 27–31)
MCH RBC QN AUTO: 24.3 PG (ref 27–31)
MCH RBC QN AUTO: 24.6 PG (ref 27–31)
MCH RBC QN AUTO: 24.7 PG (ref 27–31)
MCH RBC QN AUTO: 25.3 PG (ref 27–31)
MCH RBC QN AUTO: 25.5 PG (ref 27–31)
MCH RBC QN AUTO: 25.5 PG (ref 27–31)
MCH RBC QN AUTO: 25.6 PG (ref 27–31)
MCH RBC QN AUTO: 25.6 PG (ref 27–31)
MCH RBC QN AUTO: 25.7 PG (ref 27–31)
MCH RBC QN AUTO: 25.7 PG (ref 27–31)
MCH RBC QN AUTO: 25.8 PG (ref 27–31)
MCH RBC QN AUTO: 25.9 PG (ref 27–31)
MCHC RBC AUTO-ENTMCNC: 25.7 % (ref 32–36)
MCHC RBC AUTO-ENTMCNC: 26.5 % (ref 32–36)
MCHC RBC AUTO-ENTMCNC: 26.7 % (ref 32–36)
MCHC RBC AUTO-ENTMCNC: 27.2 % (ref 32–36)
MCHC RBC AUTO-ENTMCNC: 27.5 % (ref 32–36)
MCHC RBC AUTO-ENTMCNC: 28.3 % (ref 32–36)
MCHC RBC AUTO-ENTMCNC: 28.6 % (ref 32–36)
MCHC RBC AUTO-ENTMCNC: 28.8 % (ref 32–36)
MCHC RBC AUTO-ENTMCNC: 28.8 % (ref 32–36)
MCHC RBC AUTO-ENTMCNC: 29 % (ref 32–36)
MCHC RBC AUTO-ENTMCNC: 29 % (ref 32–36)
MCHC RBC AUTO-ENTMCNC: 29.2 % (ref 32–36)
MCHC RBC AUTO-ENTMCNC: 29.3 % (ref 32–36)
MCHC RBC AUTO-ENTMCNC: 29.4 % (ref 32–36)
MCHC RBC AUTO-ENTMCNC: 29.9 % (ref 32–36)
MCHC RBC AUTO-ENTMCNC: 30 % (ref 32–36)
MCHC RBC AUTO-ENTMCNC: 30 % (ref 32–36)
MCV RBC AUTO: 81.2 FL (ref 78–100)
MCV RBC AUTO: 83.4 FL (ref 78–100)
MCV RBC AUTO: 84.5 FL (ref 78–100)
MCV RBC AUTO: 84.9 FL (ref 78–100)
MCV RBC AUTO: 85.2 FL (ref 78–100)
MCV RBC AUTO: 85.2 FL (ref 78–100)
MCV RBC AUTO: 85.9 FL (ref 78–100)
MCV RBC AUTO: 86.1 FL (ref 78–100)
MCV RBC AUTO: 86.2 FL (ref 78–100)
MCV RBC AUTO: 87.1 FL (ref 78–100)
MCV RBC AUTO: 87.2 FL (ref 78–100)
MCV RBC AUTO: 87.4 FL (ref 78–100)
MCV RBC AUTO: 87.8 FL (ref 78–100)
MCV RBC AUTO: 88.3 FL (ref 78–100)
MCV RBC AUTO: 89.5 FL (ref 78–100)
MCV RBC AUTO: 90.6 FL (ref 78–100)
MCV RBC AUTO: 94.9 FL (ref 78–100)
MONOCYTES ABSOLUTE: 0.3 K/CU MM
MONOCYTES ABSOLUTE: 0.4 K/CU MM
MONOCYTES ABSOLUTE: 0.4 K/CU MM
MONOCYTES ABSOLUTE: 0.5 K/CU MM
MONOCYTES ABSOLUTE: 0.5 K/CU MM
MONOCYTES ABSOLUTE: 0.6 K/CU MM
MONOCYTES ABSOLUTE: 0.6 K/CU MM
MONOCYTES ABSOLUTE: 0.7 K/CU MM
MONOCYTES ABSOLUTE: 0.7 K/CU MM
MONOCYTES ABSOLUTE: 1 K/CU MM
MONOCYTES ABSOLUTE: 1 K/CU MM
MONOCYTES ABSOLUTE: 1.1 K/CU MM
MONOCYTES RELATIVE PERCENT: 2.7 % (ref 0–4)
MONOCYTES RELATIVE PERCENT: 4.8 % (ref 0–4)
MONOCYTES RELATIVE PERCENT: 5.4 % (ref 0–4)
MONOCYTES RELATIVE PERCENT: 6.1 % (ref 0–4)
MONOCYTES RELATIVE PERCENT: 6.2 % (ref 0–4)
MONOCYTES RELATIVE PERCENT: 6.2 % (ref 0–4)
MONOCYTES RELATIVE PERCENT: 6.6 % (ref 0–4)
MONOCYTES RELATIVE PERCENT: 6.6 % (ref 0–4)
MONOCYTES RELATIVE PERCENT: 6.9 % (ref 0–4)
MONOCYTES RELATIVE PERCENT: 7.4 % (ref 0–4)
MONOCYTES RELATIVE PERCENT: 8.3 % (ref 0–4)
MONOCYTES RELATIVE PERCENT: 8.6 % (ref 0–4)
MUCUS: ABNORMAL HPF
MUCUS: ABNORMAL HPF
NITRITE URINE, QUANTITATIVE: NEGATIVE
NUCLEATED RBC %: 0 %
NUCLEATED RBC %: 0.2 %
NUCLEATED RBC %: 0.2 %
PCT TRANSFERRIN: 5 % (ref 10–44)
PCT TRANSFERRIN: 9 % (ref 10–44)
PDW BLD-RTO: 16.3 % (ref 11.7–14.9)
PDW BLD-RTO: 16.9 % (ref 11.7–14.9)
PDW BLD-RTO: 17.3 % (ref 11.7–14.9)
PDW BLD-RTO: 17.4 % (ref 11.7–14.9)
PDW BLD-RTO: 17.7 % (ref 11.7–14.9)
PDW BLD-RTO: 18.4 % (ref 11.7–14.9)
PDW BLD-RTO: 18.5 % (ref 11.7–14.9)
PDW BLD-RTO: 18.5 % (ref 11.7–14.9)
PDW BLD-RTO: 18.6 % (ref 11.7–14.9)
PDW BLD-RTO: 18.6 % (ref 11.7–14.9)
PDW BLD-RTO: 18.9 % (ref 11.7–14.9)
PDW BLD-RTO: 18.9 % (ref 11.7–14.9)
PDW BLD-RTO: 20.7 % (ref 11.7–14.9)
PDW BLD-RTO: 21.5 % (ref 11.7–14.9)
PDW BLD-RTO: 21.7 % (ref 11.7–14.9)
PDW BLD-RTO: 22.3 % (ref 11.7–14.9)
PDW BLD-RTO: 23.5 % (ref 11.7–14.9)
PH, URINE: 7 (ref 5–8)
PLATELET # BLD: 334 K/CU MM (ref 140–440)
PLATELET # BLD: 340 K/CU MM (ref 140–440)
PLATELET # BLD: 391 K/CU MM (ref 140–440)
PLATELET # BLD: 392 K/CU MM (ref 140–440)
PLATELET # BLD: 396 K/CU MM (ref 140–440)
PLATELET # BLD: 397 K/CU MM (ref 140–440)
PLATELET # BLD: 398 K/CU MM (ref 140–440)
PLATELET # BLD: 400 K/CU MM (ref 140–440)
PLATELET # BLD: 416 K/CU MM (ref 140–440)
PLATELET # BLD: 425 K/CU MM (ref 140–440)
PLATELET # BLD: 428 K/CU MM (ref 140–440)
PLATELET # BLD: 448 K/CU MM (ref 140–440)
PLATELET # BLD: 492 K/CU MM (ref 140–440)
PLATELET # BLD: 531 K/CU MM (ref 140–440)
PLATELET # BLD: 556 K/CU MM (ref 140–440)
PLATELET # BLD: 565 K/CU MM (ref 140–440)
PLATELET # BLD: ABNORMAL K/CU MM (ref 140–440)
PMV BLD AUTO: 10.2 FL (ref 7.5–11.1)
PMV BLD AUTO: 8.4 FL (ref 7.5–11.1)
PMV BLD AUTO: 8.6 FL (ref 7.5–11.1)
PMV BLD AUTO: 8.7 FL (ref 7.5–11.1)
PMV BLD AUTO: 8.9 FL (ref 7.5–11.1)
PMV BLD AUTO: 8.9 FL (ref 7.5–11.1)
PMV BLD AUTO: 9 FL (ref 7.5–11.1)
PMV BLD AUTO: 9.1 FL (ref 7.5–11.1)
PMV BLD AUTO: 9.2 FL (ref 7.5–11.1)
PMV BLD AUTO: 9.2 FL (ref 7.5–11.1)
POTASSIUM SERPL-SCNC: 3.3 MMOL/L (ref 3.5–5.1)
POTASSIUM SERPL-SCNC: 3.5 MMOL/L (ref 3.5–5.1)
POTASSIUM SERPL-SCNC: 3.7 MMOL/L (ref 3.5–5.1)
POTASSIUM SERPL-SCNC: 3.8 MMOL/L (ref 3.5–5.1)
POTASSIUM SERPL-SCNC: 3.9 MMOL/L (ref 3.5–5.1)
POTASSIUM SERPL-SCNC: 4 MMOL/L (ref 3.5–5.1)
POTASSIUM SERPL-SCNC: 4.3 MMOL/L (ref 3.5–5.1)
POTASSIUM SERPL-SCNC: 4.4 MMOL/L (ref 3.5–5.1)
POTASSIUM SERPL-SCNC: 4.5 MMOL/L (ref 3.5–5.1)
POTASSIUM SERPL-SCNC: 5.1 MMOL/L (ref 3.5–5.1)
PRO-BNP: 220.2 PG/ML
PROTEIN UA: 30 MG/DL
PROTEIN UA: >500 MG/DL
PROTEIN UA: NEGATIVE MG/DL
RBC # BLD: 2.82 M/CU MM (ref 4.2–5.4)
RBC # BLD: 3.12 M/CU MM (ref 4.2–5.4)
RBC # BLD: 3.25 M/CU MM (ref 4.2–5.4)
RBC # BLD: 3.28 M/CU MM (ref 4.2–5.4)
RBC # BLD: 3.33 M/CU MM (ref 4.2–5.4)
RBC # BLD: 3.41 M/CU MM (ref 4.2–5.4)
RBC # BLD: 3.51 M/CU MM (ref 4.2–5.4)
RBC # BLD: 3.51 M/CU MM (ref 4.2–5.4)
RBC # BLD: 3.62 M/CU MM (ref 4.2–5.4)
RBC # BLD: 3.73 M/CU MM (ref 4.2–5.4)
RBC # BLD: 3.79 M/CU MM (ref 4.2–5.4)
RBC # BLD: 3.95 M/CU MM (ref 4.2–5.4)
RBC # BLD: 3.96 M/CU MM (ref 4.2–5.4)
RBC # BLD: 4.03 M/CU MM (ref 4.2–5.4)
RBC # BLD: 4.1 M/CU MM (ref 4.2–5.4)
RBC # BLD: ABNORMAL 10*6/UL
RBC URINE: 27 /HPF (ref 0–6)
RBC URINE: 6 /HPF (ref 0–6)
RBC URINE: 6 /HPF (ref 0–6)
REASON FOR REJECTION: NORMAL
REASON FOR REJECTION: NORMAL
REJECTED TEST: NORMAL
RHEUMATOID FACTOR: 107 IU/ML (ref 0–14)
RHEUMATOID FACTOR: 164 IU/ML (ref 0–14)
RHEUMATOID FACTOR: ABNORMAL IU/ML (ref 0–14)
RHEUMATOID FACTOR: ABNORMAL IU/ML (ref 0–14)
SEGMENTED NEUTROPHILS ABSOLUTE COUNT: 10.8 K/CU MM
SEGMENTED NEUTROPHILS ABSOLUTE COUNT: 3.9 K/CU MM
SEGMENTED NEUTROPHILS ABSOLUTE COUNT: 4.4 K/CU MM
SEGMENTED NEUTROPHILS ABSOLUTE COUNT: 4.6 K/CU MM
SEGMENTED NEUTROPHILS ABSOLUTE COUNT: 4.8 K/CU MM
SEGMENTED NEUTROPHILS ABSOLUTE COUNT: 5.4 K/CU MM
SEGMENTED NEUTROPHILS ABSOLUTE COUNT: 5.9 K/CU MM
SEGMENTED NEUTROPHILS ABSOLUTE COUNT: 6 K/CU MM
SEGMENTED NEUTROPHILS ABSOLUTE COUNT: 6.1 K/CU MM
SEGMENTED NEUTROPHILS ABSOLUTE COUNT: 8.1 K/CU MM
SEGMENTED NEUTROPHILS ABSOLUTE COUNT: 9.2 K/CU MM
SEGMENTED NEUTROPHILS ABSOLUTE COUNT: 9.2 K/CU MM
SEGMENTED NEUTROPHILS RELATIVE PERCENT: 55.5 % (ref 36–66)
SEGMENTED NEUTROPHILS RELATIVE PERCENT: 56.7 % (ref 36–66)
SEGMENTED NEUTROPHILS RELATIVE PERCENT: 57.2 % (ref 36–66)
SEGMENTED NEUTROPHILS RELATIVE PERCENT: 59.5 % (ref 36–66)
SEGMENTED NEUTROPHILS RELATIVE PERCENT: 60 % (ref 36–66)
SEGMENTED NEUTROPHILS RELATIVE PERCENT: 60.1 % (ref 36–66)
SEGMENTED NEUTROPHILS RELATIVE PERCENT: 65.1 % (ref 36–66)
SEGMENTED NEUTROPHILS RELATIVE PERCENT: 66.3 % (ref 36–66)
SEGMENTED NEUTROPHILS RELATIVE PERCENT: 69 % (ref 36–66)
SEGMENTED NEUTROPHILS RELATIVE PERCENT: 69.3 % (ref 36–66)
SEGMENTED NEUTROPHILS RELATIVE PERCENT: 73.2 % (ref 36–66)
SEGMENTED NEUTROPHILS RELATIVE PERCENT: 76.2 % (ref 36–66)
SODIUM BLD-SCNC: 129 MMOL/L (ref 135–145)
SODIUM BLD-SCNC: 129 MMOL/L (ref 135–145)
SODIUM BLD-SCNC: 131 MMOL/L (ref 135–145)
SODIUM BLD-SCNC: 131 MMOL/L (ref 135–145)
SODIUM BLD-SCNC: 132 MMOL/L (ref 135–145)
SODIUM BLD-SCNC: 133 MMOL/L (ref 135–145)
SODIUM BLD-SCNC: 134 MMOL/L (ref 135–145)
SODIUM BLD-SCNC: 135 MMOL/L (ref 135–145)
SODIUM BLD-SCNC: 135 MMOL/L (ref 135–145)
SODIUM BLD-SCNC: 136 MMOL/L (ref 135–145)
SODIUM BLD-SCNC: 137 MMOL/L (ref 135–145)
SODIUM BLD-SCNC: 138 MMOL/L (ref 135–145)
SODIUM BLD-SCNC: 138 MMOL/L (ref 135–145)
SPECIFIC GRAVITY UA: 1.01 (ref 1–1.03)
SPECIMEN: ABNORMAL
SPECIMEN: NORMAL
SQUAMOUS EPITHELIAL: 2 /HPF
SQUAMOUS EPITHELIAL: 2 /HPF
STATUS: NORMAL
TOTAL CK: 13 IU/L (ref 26–140)
TOTAL COLONY COUNT: ABNORMAL
TOTAL IMMATURE NEUTOROPHIL: 0.02 K/CU MM
TOTAL IMMATURE NEUTOROPHIL: 0.03 K/CU MM
TOTAL IMMATURE NEUTOROPHIL: 0.03 K/CU MM
TOTAL IMMATURE NEUTOROPHIL: 0.04 K/CU MM
TOTAL IMMATURE NEUTOROPHIL: 0.05 K/CU MM
TOTAL IMMATURE NEUTOROPHIL: 0.05 K/CU MM
TOTAL IMMATURE NEUTOROPHIL: 0.09 K/CU MM
TOTAL IMMATURE NEUTOROPHIL: 0.14 K/CU MM
TOTAL IMMATURE NEUTOROPHIL: 0.16 K/CU MM
TOTAL IMMATURE NEUTOROPHIL: 0.21 K/CU MM
TOTAL IRON BINDING CAPACITY: 126 UG/DL (ref 250–450)
TOTAL IRON BINDING CAPACITY: 208 UG/DL (ref 250–450)
TOTAL NUCLEATED RBC: 0 K/CU MM
TOTAL PROTEIN: 6.3 GM/DL (ref 6.4–8.2)
TOTAL PROTEIN: 6.6 GM/DL (ref 6.4–8.2)
TOTAL PROTEIN: 6.8 GM/DL (ref 6.4–8.2)
TOTAL PROTEIN: 7 GM/DL (ref 6.4–8.2)
TOTAL PROTEIN: 7.4 GM/DL (ref 6.4–8.2)
TOTAL PROTEIN: 7.8 GM/DL (ref 6.4–8.2)
TOTAL PROTEIN: 8.3 GM/DL (ref 6.4–8.2)
TRANSFUSION STATUS: NORMAL
TRICHOMONAS: ABNORMAL /HPF
TRIGL SERPL-MCNC: 106 MG/DL
TRIGL SERPL-MCNC: 108 MG/DL
TRIGL SERPL-MCNC: 235 MG/DL
TROPONIN T: <0.01 NG/ML
TROPONIN T: <0.01 NG/ML
UNCLASSIFIED CAST: 4 /LPF
UNIT DIVISION: 0
UNIT NUMBER: NORMAL
UNSATURATED IRON BINDING CAPACITY: 115 UG/DL (ref 110–370)
UNSATURATED IRON BINDING CAPACITY: 197 UG/DL (ref 110–370)
UROBILINOGEN, URINE: 1 MG/DL (ref 0.2–1)
UROBILINOGEN, URINE: NORMAL MG/DL (ref 0.2–1)
UROBILINOGEN, URINE: NORMAL MG/DL (ref 0.2–1)
VANCOMYCIN RANDOM: 20.1 UG/ML
VANCOMYCIN TROUGH: 32.5 UG/ML (ref 10–20)
WBC # BLD: 10.1 K/CU MM (ref 4–10.5)
WBC # BLD: 12 K/CU MM (ref 4–10.5)
WBC # BLD: 12.4 K/CU MM (ref 4–10.5)
WBC # BLD: 13.9 K/CU MM (ref 4–10.5)
WBC # BLD: 15.6 K/CU MM (ref 4–10.5)
WBC # BLD: 6.5 K/CU MM (ref 4–10.5)
WBC # BLD: 6.9 K/CU MM (ref 4–10.5)
WBC # BLD: 7.7 K/CU MM (ref 4–10.5)
WBC # BLD: 8.1 K/CU MM (ref 4–10.5)
WBC # BLD: 8.3 K/CU MM (ref 4–10.5)
WBC # BLD: 8.3 K/CU MM (ref 4–10.5)
WBC # BLD: 8.4 K/CU MM (ref 4–10.5)
WBC # BLD: 8.5 K/CU MM (ref 4–10.5)
WBC # BLD: 8.6 K/CU MM (ref 4–10.5)
WBC # BLD: 8.6 K/CU MM (ref 4–10.5)
WBC # BLD: 9 K/CU MM (ref 4–10.5)
WBC # BLD: 9.6 K/CU MM (ref 4–10.5)
WBC CLUMP: ABNORMAL /HPF
WBC CLUMP: ABNORMAL /HPF
WBC UA: 117 /HPF (ref 0–5)
WBC UA: 403 /HPF (ref 0–5)
WBC UA: 43 /HPF (ref 0–5)

## 2019-01-01 PROCEDURE — 86140 C-REACTIVE PROTEIN: CPT

## 2019-01-01 PROCEDURE — 6370000000 HC RX 637 (ALT 250 FOR IP): Performed by: INTERNAL MEDICINE

## 2019-01-01 PROCEDURE — 6360000002 HC RX W HCPCS: Performed by: INTERNAL MEDICINE

## 2019-01-01 PROCEDURE — 36415 COLL VENOUS BLD VENIPUNCTURE: CPT

## 2019-01-01 PROCEDURE — 87077 CULTURE AEROBIC IDENTIFY: CPT

## 2019-01-01 PROCEDURE — 85025 COMPLETE CBC W/AUTO DIFF WBC: CPT

## 2019-01-01 PROCEDURE — 6360000002 HC RX W HCPCS: Performed by: NURSE PRACTITIONER

## 2019-01-01 PROCEDURE — 86922 COMPATIBILITY TEST ANTIGLOB: CPT

## 2019-01-01 PROCEDURE — 96375 TX/PRO/DX INJ NEW DRUG ADDON: CPT

## 2019-01-01 PROCEDURE — 2580000003 HC RX 258: Performed by: FAMILY MEDICINE

## 2019-01-01 PROCEDURE — 94761 N-INVAS EAR/PLS OXIMETRY MLT: CPT

## 2019-01-01 PROCEDURE — 2580000003 HC RX 258: Performed by: STUDENT IN AN ORGANIZED HEALTH CARE EDUCATION/TRAINING PROGRAM

## 2019-01-01 PROCEDURE — 76937 US GUIDE VASCULAR ACCESS: CPT

## 2019-01-01 PROCEDURE — 6370000000 HC RX 637 (ALT 250 FOR IP): Performed by: NURSE PRACTITIONER

## 2019-01-01 PROCEDURE — 2580000003 HC RX 258: Performed by: INTERNAL MEDICINE

## 2019-01-01 PROCEDURE — 85652 RBC SED RATE AUTOMATED: CPT

## 2019-01-01 PROCEDURE — 70487 CT MAXILLOFACIAL W/DYE: CPT

## 2019-01-01 PROCEDURE — 83550 IRON BINDING TEST: CPT

## 2019-01-01 PROCEDURE — 1250000000 HC SEMI PRIVATE HOSPICE R&B

## 2019-01-01 PROCEDURE — 80202 ASSAY OF VANCOMYCIN: CPT

## 2019-01-01 PROCEDURE — 1200000000 HC SEMI PRIVATE

## 2019-01-01 PROCEDURE — 83880 ASSAY OF NATRIURETIC PEPTIDE: CPT

## 2019-01-01 PROCEDURE — 83605 ASSAY OF LACTIC ACID: CPT

## 2019-01-01 PROCEDURE — 96361 HYDRATE IV INFUSION ADD-ON: CPT

## 2019-01-01 PROCEDURE — 6360000002 HC RX W HCPCS: Performed by: PHYSICIAN ASSISTANT

## 2019-01-01 PROCEDURE — 80061 LIPID PANEL: CPT

## 2019-01-01 PROCEDURE — 80048 BASIC METABOLIC PNL TOTAL CA: CPT

## 2019-01-01 PROCEDURE — P9016 RBC LEUKOCYTES REDUCED: HCPCS

## 2019-01-01 PROCEDURE — 2580000003 HC RX 258: Performed by: PHYSICIAN ASSISTANT

## 2019-01-01 PROCEDURE — C9113 INJ PANTOPRAZOLE SODIUM, VIA: HCPCS | Performed by: NURSE PRACTITIONER

## 2019-01-01 PROCEDURE — 82150 ASSAY OF AMYLASE: CPT

## 2019-01-01 PROCEDURE — 87086 URINE CULTURE/COLONY COUNT: CPT

## 2019-01-01 PROCEDURE — 6370000000 HC RX 637 (ALT 250 FOR IP): Performed by: FAMILY MEDICINE

## 2019-01-01 PROCEDURE — 6360000002 HC RX W HCPCS: Performed by: FAMILY MEDICINE

## 2019-01-01 PROCEDURE — 6370000000 HC RX 637 (ALT 250 FOR IP): Performed by: PHYSICIAN ASSISTANT

## 2019-01-01 PROCEDURE — 2500000003 HC RX 250 WO HCPCS: Performed by: STUDENT IN AN ORGANIZED HEALTH CARE EDUCATION/TRAINING PROGRAM

## 2019-01-01 PROCEDURE — 81001 URINALYSIS AUTO W/SCOPE: CPT

## 2019-01-01 PROCEDURE — 99305 1ST NF CARE MODERATE MDM 35: CPT | Performed by: NURSE PRACTITIONER

## 2019-01-01 PROCEDURE — 2580000003 HC RX 258: Performed by: NURSE PRACTITIONER

## 2019-01-01 PROCEDURE — 82728 ASSAY OF FERRITIN: CPT

## 2019-01-01 PROCEDURE — 84484 ASSAY OF TROPONIN QUANT: CPT

## 2019-01-01 PROCEDURE — 87186 SC STD MICRODIL/AGAR DIL: CPT

## 2019-01-01 PROCEDURE — C9113 INJ PANTOPRAZOLE SODIUM, VIA: HCPCS | Performed by: INTERNAL MEDICINE

## 2019-01-01 PROCEDURE — 82962 GLUCOSE BLOOD TEST: CPT

## 2019-01-01 PROCEDURE — 51798 US URINE CAPACITY MEASURE: CPT

## 2019-01-01 PROCEDURE — 93005 ELECTROCARDIOGRAM TRACING: CPT | Performed by: PHYSICIAN ASSISTANT

## 2019-01-01 PROCEDURE — 74177 CT ABD & PELVIS W/CONTRAST: CPT

## 2019-01-01 PROCEDURE — 86430 RHEUMATOID FACTOR TEST QUAL: CPT

## 2019-01-01 PROCEDURE — 71260 CT THORAX DX C+: CPT

## 2019-01-01 PROCEDURE — 6360000004 HC RX CONTRAST MEDICATION: Performed by: EMERGENCY MEDICINE

## 2019-01-01 PROCEDURE — 86038 ANTINUCLEAR ANTIBODIES: CPT

## 2019-01-01 PROCEDURE — 87040 BLOOD CULTURE FOR BACTERIA: CPT

## 2019-01-01 PROCEDURE — 80074 ACUTE HEPATITIS PANEL: CPT

## 2019-01-01 PROCEDURE — 96374 THER/PROPH/DIAG INJ IV PUSH: CPT

## 2019-01-01 PROCEDURE — 80053 COMPREHEN METABOLIC PANEL: CPT

## 2019-01-01 PROCEDURE — 6360000004 HC RX CONTRAST MEDICATION: Performed by: PHYSICIAN ASSISTANT

## 2019-01-01 PROCEDURE — 82140 ASSAY OF AMMONIA: CPT

## 2019-01-01 PROCEDURE — 6360000002 HC RX W HCPCS: Performed by: STUDENT IN AN ORGANIZED HEALTH CARE EDUCATION/TRAINING PROGRAM

## 2019-01-01 PROCEDURE — 99285 EMERGENCY DEPT VISIT HI MDM: CPT

## 2019-01-01 PROCEDURE — 85027 COMPLETE CBC AUTOMATED: CPT

## 2019-01-01 PROCEDURE — 2500000003 HC RX 250 WO HCPCS: Performed by: NURSE PRACTITIONER

## 2019-01-01 PROCEDURE — 71046 X-RAY EXAM CHEST 2 VIEWS: CPT

## 2019-01-01 PROCEDURE — 86900 BLOOD TYPING SEROLOGIC ABO: CPT

## 2019-01-01 PROCEDURE — 93970 EXTREMITY STUDY: CPT

## 2019-01-01 PROCEDURE — 83540 ASSAY OF IRON: CPT

## 2019-01-01 PROCEDURE — 2580000003 HC RX 258: Performed by: EMERGENCY MEDICINE

## 2019-01-01 PROCEDURE — 2700000000 HC OXYGEN THERAPY PER DAY

## 2019-01-01 PROCEDURE — 36430 TRANSFUSION BLD/BLD COMPNT: CPT

## 2019-01-01 PROCEDURE — 83721 ASSAY OF BLOOD LIPOPROTEIN: CPT

## 2019-01-01 PROCEDURE — 93010 ELECTROCARDIOGRAM REPORT: CPT | Performed by: INTERNAL MEDICINE

## 2019-01-01 PROCEDURE — 86850 RBC ANTIBODY SCREEN: CPT

## 2019-01-01 PROCEDURE — 2580000003 HC RX 258

## 2019-01-01 PROCEDURE — 2060000000 HC ICU INTERMEDIATE R&B

## 2019-01-01 PROCEDURE — 72131 CT LUMBAR SPINE W/O DYE: CPT

## 2019-01-01 PROCEDURE — 6370000000 HC RX 637 (ALT 250 FOR IP): Performed by: EMERGENCY MEDICINE

## 2019-01-01 PROCEDURE — 51702 INSERT TEMP BLADDER CATH: CPT

## 2019-01-01 PROCEDURE — 51701 INSERT BLADDER CATHETER: CPT

## 2019-01-01 PROCEDURE — 85014 HEMATOCRIT: CPT

## 2019-01-01 PROCEDURE — 83690 ASSAY OF LIPASE: CPT

## 2019-01-01 PROCEDURE — 6370000000 HC RX 637 (ALT 250 FOR IP): Performed by: STUDENT IN AN ORGANIZED HEALTH CARE EDUCATION/TRAINING PROGRAM

## 2019-01-01 PROCEDURE — 4500000027

## 2019-01-01 PROCEDURE — 6360000004 HC RX CONTRAST MEDICATION: Performed by: STUDENT IN AN ORGANIZED HEALTH CARE EDUCATION/TRAINING PROGRAM

## 2019-01-01 PROCEDURE — 99284 EMERGENCY DEPT VISIT MOD MDM: CPT

## 2019-01-01 PROCEDURE — 83735 ASSAY OF MAGNESIUM: CPT

## 2019-01-01 PROCEDURE — 82977 ASSAY OF GGT: CPT

## 2019-01-01 PROCEDURE — 85018 HEMOGLOBIN: CPT

## 2019-01-01 PROCEDURE — 86677 HELICOBACTER PYLORI ANTIBODY: CPT

## 2019-01-01 PROCEDURE — 82550 ASSAY OF CK (CPK): CPT

## 2019-01-01 PROCEDURE — 96365 THER/PROPH/DIAG IV INF INIT: CPT

## 2019-01-01 PROCEDURE — 86901 BLOOD TYPING SEROLOGIC RH(D): CPT

## 2019-01-01 PROCEDURE — 71275 CT ANGIOGRAPHY CHEST: CPT

## 2019-01-01 RX ORDER — HYDROCODONE BITARTRATE AND ACETAMINOPHEN 5; 325 MG/1; MG/1
1 TABLET ORAL 2 TIMES DAILY PRN
Status: DISCONTINUED | OUTPATIENT
Start: 2019-01-01 | End: 2019-01-01 | Stop reason: HOSPADM

## 2019-01-01 RX ORDER — DEXTROSE MONOHYDRATE 50 MG/ML
100 INJECTION, SOLUTION INTRAVENOUS PRN
Status: DISCONTINUED | OUTPATIENT
Start: 2019-01-01 | End: 2019-01-01 | Stop reason: HOSPADM

## 2019-01-01 RX ORDER — MORPHINE SULFATE/0.9% NACL/PF 1 MG/ML
SYRINGE (ML) INJECTION CONTINUOUS
Status: DISCONTINUED | OUTPATIENT
Start: 2019-01-01 | End: 2019-01-01

## 2019-01-01 RX ORDER — ONDANSETRON 2 MG/ML
4 INJECTION INTRAMUSCULAR; INTRAVENOUS EVERY 6 HOURS PRN
Status: DISCONTINUED | OUTPATIENT
Start: 2019-01-01 | End: 2019-01-01 | Stop reason: HOSPADM

## 2019-01-01 RX ORDER — PROMETHAZINE HYDROCHLORIDE 25 MG/1
25 TABLET ORAL EVERY 6 HOURS PRN
Status: DISCONTINUED | OUTPATIENT
Start: 2019-01-01 | End: 2019-01-01 | Stop reason: HOSPADM

## 2019-01-01 RX ORDER — HYDROCODONE BITARTRATE AND ACETAMINOPHEN 5; 325 MG/1; MG/1
1 TABLET ORAL 2 TIMES DAILY
Status: DISCONTINUED | OUTPATIENT
Start: 2019-01-01 | End: 2019-01-01 | Stop reason: HOSPADM

## 2019-01-01 RX ORDER — AMOXICILLIN AND CLAVULANATE POTASSIUM 875; 125 MG/1; MG/1
1 TABLET, FILM COATED ORAL 2 TIMES DAILY
Qty: 14 TABLET | Refills: 0 | Status: SHIPPED | OUTPATIENT
Start: 2019-01-01 | End: 2019-01-01

## 2019-01-01 RX ORDER — LEVETIRACETAM 500 MG/1
1500 TABLET ORAL 2 TIMES DAILY
Status: DISCONTINUED | OUTPATIENT
Start: 2019-01-01 | End: 2019-01-01 | Stop reason: HOSPADM

## 2019-01-01 RX ORDER — OYSTER SHELL CALCIUM WITH VITAMIN D 500; 200 MG/1; [IU]/1
1 TABLET, FILM COATED ORAL 3 TIMES DAILY
Status: DISCONTINUED | OUTPATIENT
Start: 2019-01-01 | End: 2019-01-01 | Stop reason: HOSPADM

## 2019-01-01 RX ORDER — MIDODRINE HYDROCHLORIDE 5 MG/1
10 TABLET ORAL
Status: DISCONTINUED | OUTPATIENT
Start: 2019-01-01 | End: 2019-01-01 | Stop reason: HOSPADM

## 2019-01-01 RX ORDER — METHADONE HYDROCHLORIDE 10 MG/ML
5 CONCENTRATE ORAL EVERY 6 HOURS
Status: DISCONTINUED | OUTPATIENT
Start: 2019-01-01 | End: 2019-01-01

## 2019-01-01 RX ORDER — KETOROLAC TROMETHAMINE 30 MG/ML
15 INJECTION, SOLUTION INTRAMUSCULAR; INTRAVENOUS EVERY 8 HOURS
Status: DISCONTINUED | OUTPATIENT
Start: 2019-01-01 | End: 2019-01-01

## 2019-01-01 RX ORDER — ACETAMINOPHEN 325 MG/1
650 TABLET ORAL EVERY 4 HOURS PRN
Status: DISCONTINUED | OUTPATIENT
Start: 2019-01-01 | End: 2019-01-01 | Stop reason: HOSPADM

## 2019-01-01 RX ORDER — POTASSIUM CHLORIDE 750 MG/1
40 TABLET, FILM COATED, EXTENDED RELEASE ORAL ONCE
Status: COMPLETED | OUTPATIENT
Start: 2019-01-01 | End: 2019-01-01

## 2019-01-01 RX ORDER — MORPHINE SULFATE 4 MG/ML
4 INJECTION, SOLUTION INTRAMUSCULAR; INTRAVENOUS ONCE
Status: COMPLETED | OUTPATIENT
Start: 2019-01-01 | End: 2019-01-01

## 2019-01-01 RX ORDER — OXYBUTYNIN CHLORIDE 5 MG/1
5 TABLET ORAL 3 TIMES DAILY
COMMUNITY

## 2019-01-01 RX ORDER — SODIUM CHLORIDE 0.9 % (FLUSH) 0.9 %
10 SYRINGE (ML) INJECTION PRN
Status: DISCONTINUED | OUTPATIENT
Start: 2019-01-01 | End: 2019-01-01 | Stop reason: HOSPADM

## 2019-01-01 RX ORDER — METHADONE HYDROCHLORIDE 10 MG/ML
3 CONCENTRATE ORAL EVERY 6 HOURS
Status: DISCONTINUED | OUTPATIENT
Start: 2019-01-01 | End: 2019-01-01

## 2019-01-01 RX ORDER — NORTRIPTYLINE HYDROCHLORIDE 25 MG/1
50 CAPSULE ORAL NIGHTLY
Status: DISCONTINUED | OUTPATIENT
Start: 2019-01-01 | End: 2019-01-01 | Stop reason: HOSPADM

## 2019-01-01 RX ORDER — MORPHINE SULFATE 4 MG/ML
INJECTION, SOLUTION INTRAMUSCULAR; INTRAVENOUS
Status: DISPENSED
Start: 2019-01-01 | End: 2019-01-01

## 2019-01-01 RX ORDER — MORPHINE SULFATE 4 MG/ML
1 INJECTION, SOLUTION INTRAMUSCULAR; INTRAVENOUS ONCE
Status: COMPLETED | OUTPATIENT
Start: 2019-01-01 | End: 2019-01-01

## 2019-01-01 RX ORDER — SENNA PLUS 8.6 MG/1
2 TABLET ORAL 2 TIMES DAILY
Qty: 120 TABLET | Refills: 0 | Status: SHIPPED
Start: 2019-01-01 | End: 2019-01-01

## 2019-01-01 RX ORDER — BACITRACIN 500 [USP'U]/G
OINTMENT OPHTHALMIC
Qty: 1 G | Refills: 0 | Status: SHIPPED
Start: 2019-01-01 | End: 2019-01-01

## 2019-01-01 RX ORDER — MORPHINE SULFATE 4 MG/ML
4 INJECTION, SOLUTION INTRAMUSCULAR; INTRAVENOUS EVERY 4 HOURS PRN
Status: DISCONTINUED | OUTPATIENT
Start: 2019-01-01 | End: 2019-01-01

## 2019-01-01 RX ORDER — PANTOPRAZOLE SODIUM 40 MG/10ML
40 INJECTION, POWDER, LYOPHILIZED, FOR SOLUTION INTRAVENOUS 2 TIMES DAILY
Status: DISCONTINUED | OUTPATIENT
Start: 2019-01-01 | End: 2019-01-01 | Stop reason: HOSPADM

## 2019-01-01 RX ORDER — LEVETIRACETAM 500 MG/1
1000 TABLET ORAL 2 TIMES DAILY
COMMUNITY

## 2019-01-01 RX ORDER — GLYCOPYRROLATE 0.2 MG/ML
0.2 INJECTION INTRAMUSCULAR; INTRAVENOUS EVERY 4 HOURS PRN
Status: DISCONTINUED | OUTPATIENT
Start: 2019-01-01 | End: 2019-01-01 | Stop reason: SDUPTHER

## 2019-01-01 RX ORDER — BACLOFEN 10 MG/1
20 TABLET ORAL 2 TIMES DAILY
Status: DISCONTINUED | OUTPATIENT
Start: 2019-01-01 | End: 2019-01-01 | Stop reason: HOSPADM

## 2019-01-01 RX ORDER — GABAPENTIN 300 MG/1
300 CAPSULE ORAL 2 TIMES DAILY
Status: DISCONTINUED | OUTPATIENT
Start: 2019-01-01 | End: 2019-01-01

## 2019-01-01 RX ORDER — HYDROCODONE BITARTRATE AND ACETAMINOPHEN 5; 325 MG/1; MG/1
1 TABLET ORAL ONCE
Status: COMPLETED | OUTPATIENT
Start: 2019-01-01 | End: 2019-01-01

## 2019-01-01 RX ORDER — DEXTROSE MONOHYDRATE 25 G/50ML
12.5 INJECTION, SOLUTION INTRAVENOUS PRN
Status: DISCONTINUED | OUTPATIENT
Start: 2019-01-01 | End: 2019-01-01 | Stop reason: HOSPADM

## 2019-01-01 RX ORDER — MORPHINE SULFATE 4 MG/ML
2 INJECTION, SOLUTION INTRAMUSCULAR; INTRAVENOUS ONCE
Status: COMPLETED | OUTPATIENT
Start: 2019-01-01 | End: 2019-01-01

## 2019-01-01 RX ORDER — SODIUM CHLORIDE 9 MG/ML
INJECTION, SOLUTION INTRAVENOUS CONTINUOUS
Status: DISCONTINUED | OUTPATIENT
Start: 2019-01-01 | End: 2019-01-01 | Stop reason: HOSPADM

## 2019-01-01 RX ORDER — GLYCOPYRROLATE 1 MG/5 ML
0.2 SYRINGE (ML) INTRAVENOUS EVERY 4 HOURS PRN
Status: DISCONTINUED | OUTPATIENT
Start: 2019-01-01 | End: 2019-10-11 | Stop reason: HOSPADM

## 2019-01-01 RX ORDER — CILOSTAZOL 100 MG/1
100 TABLET ORAL 2 TIMES DAILY
Status: DISCONTINUED | OUTPATIENT
Start: 2019-01-01 | End: 2019-01-01 | Stop reason: HOSPADM

## 2019-01-01 RX ORDER — DIPHENHYDRAMINE HYDROCHLORIDE 50 MG/ML
25 INJECTION INTRAMUSCULAR; INTRAVENOUS ONCE
Status: COMPLETED | OUTPATIENT
Start: 2019-01-01 | End: 2019-01-01

## 2019-01-01 RX ORDER — FAMOTIDINE 20 MG/1
10 TABLET, FILM COATED ORAL 2 TIMES DAILY
Status: DISCONTINUED | OUTPATIENT
Start: 2019-01-01 | End: 2019-01-01 | Stop reason: HOSPADM

## 2019-01-01 RX ORDER — ONDANSETRON 4 MG/1
4 TABLET, ORALLY DISINTEGRATING ORAL ONCE
Status: COMPLETED | OUTPATIENT
Start: 2019-01-01 | End: 2019-01-01

## 2019-01-01 RX ORDER — SODIUM CHLORIDE 0.9 % (FLUSH) 0.9 %
10 SYRINGE (ML) INJECTION EVERY 12 HOURS SCHEDULED
Status: DISCONTINUED | OUTPATIENT
Start: 2019-01-01 | End: 2019-01-01 | Stop reason: HOSPADM

## 2019-01-01 RX ORDER — ZONISAMIDE 100 MG/1
400 CAPSULE ORAL NIGHTLY
Status: DISCONTINUED | OUTPATIENT
Start: 2019-01-01 | End: 2019-01-01 | Stop reason: HOSPADM

## 2019-01-01 RX ORDER — CLINDAMYCIN HYDROCHLORIDE 300 MG/1
300 CAPSULE ORAL 4 TIMES DAILY
Qty: 28 CAPSULE | Refills: 0 | Status: SHIPPED | OUTPATIENT
Start: 2019-01-01 | End: 2019-01-01

## 2019-01-01 RX ORDER — GLYCOPYRROLATE 0.2 MG/ML
0.2 INJECTION INTRAMUSCULAR; INTRAVENOUS EVERY 4 HOURS PRN
Status: CANCELLED | OUTPATIENT
Start: 2019-01-01

## 2019-01-01 RX ORDER — HALOPERIDOL 5 MG/ML
1 INJECTION INTRAMUSCULAR ONCE
Status: COMPLETED | OUTPATIENT
Start: 2019-01-01 | End: 2019-01-01

## 2019-01-01 RX ORDER — POLYVINYL ALCOHOL 14 MG/ML
1 SOLUTION/ DROPS OPHTHALMIC DAILY
Status: DISCONTINUED | OUTPATIENT
Start: 2019-01-01 | End: 2019-01-01 | Stop reason: HOSPADM

## 2019-01-01 RX ORDER — CEPHALEXIN 250 MG/1
250 CAPSULE ORAL 4 TIMES DAILY
Status: ON HOLD | COMMUNITY
End: 2019-01-01 | Stop reason: HOSPADM

## 2019-01-01 RX ORDER — METOPROLOL TARTRATE 50 MG/1
100 TABLET, FILM COATED ORAL 2 TIMES DAILY
Status: DISCONTINUED | OUTPATIENT
Start: 2019-01-01 | End: 2019-01-01 | Stop reason: HOSPADM

## 2019-01-01 RX ORDER — HALOPERIDOL 5 MG/ML
INJECTION INTRAMUSCULAR
Status: DISPENSED
Start: 2019-01-01 | End: 2019-01-01

## 2019-01-01 RX ORDER — DIPHENHYDRAMINE HCL 25 MG
50 TABLET ORAL EVERY 6 HOURS PRN
Status: DISCONTINUED | OUTPATIENT
Start: 2019-01-01 | End: 2019-01-01 | Stop reason: HOSPADM

## 2019-01-01 RX ORDER — SODIUM CHLORIDE 9 MG/ML
INJECTION, SOLUTION INTRAVENOUS
Status: DISPENSED
Start: 2019-01-01 | End: 2019-01-01

## 2019-01-01 RX ORDER — SENNA PLUS 8.6 MG/1
2 TABLET ORAL 2 TIMES DAILY
Status: DISCONTINUED | OUTPATIENT
Start: 2019-01-01 | End: 2019-01-01 | Stop reason: HOSPADM

## 2019-01-01 RX ORDER — 0.9 % SODIUM CHLORIDE 0.9 %
1000 INTRAVENOUS SOLUTION INTRAVENOUS ONCE
Status: COMPLETED | OUTPATIENT
Start: 2019-01-01 | End: 2019-01-01

## 2019-01-01 RX ORDER — PROMETHAZINE HYDROCHLORIDE 25 MG/1
25 TABLET ORAL EVERY 6 HOURS PRN
COMMUNITY

## 2019-01-01 RX ORDER — 0.9 % SODIUM CHLORIDE 0.9 %
10 VIAL (ML) INJECTION
Status: COMPLETED | OUTPATIENT
Start: 2019-01-01 | End: 2019-01-01

## 2019-01-01 RX ORDER — 0.9 % SODIUM CHLORIDE 0.9 %
250 INTRAVENOUS SOLUTION INTRAVENOUS ONCE
Status: COMPLETED | OUTPATIENT
Start: 2019-01-01 | End: 2019-01-01

## 2019-01-01 RX ORDER — BACLOFEN 10 MG/1
10 TABLET ORAL 2 TIMES DAILY
Status: DISCONTINUED | OUTPATIENT
Start: 2019-01-01 | End: 2019-01-01

## 2019-01-01 RX ORDER — MORPHINE SULFATE 4 MG/ML
3 INJECTION, SOLUTION INTRAMUSCULAR; INTRAVENOUS
Status: DISCONTINUED | OUTPATIENT
Start: 2019-01-01 | End: 2019-01-01

## 2019-01-01 RX ORDER — SODIUM CHLORIDE 0.9 % (FLUSH) 0.9 %
10 SYRINGE (ML) INJECTION 2 TIMES DAILY
Status: DISCONTINUED | OUTPATIENT
Start: 2019-01-01 | End: 2019-01-01 | Stop reason: HOSPADM

## 2019-01-01 RX ORDER — NORTRIPTYLINE HYDROCHLORIDE 50 MG/1
50 CAPSULE ORAL NIGHTLY
Qty: 30 CAPSULE | Refills: 0 | Status: SHIPPED
Start: 2019-01-01

## 2019-01-01 RX ORDER — OXYBUTYNIN CHLORIDE 5 MG/1
5 TABLET ORAL 3 TIMES DAILY
Status: DISCONTINUED | OUTPATIENT
Start: 2019-01-01 | End: 2019-01-01

## 2019-01-01 RX ORDER — MIDODRINE HYDROCHLORIDE 5 MG/1
5 TABLET ORAL
Status: DISCONTINUED | OUTPATIENT
Start: 2019-01-01 | End: 2019-01-01 | Stop reason: HOSPADM

## 2019-01-01 RX ORDER — HYDROCODONE BITARTRATE AND ACETAMINOPHEN 5; 325 MG/1; MG/1
1 TABLET ORAL EVERY 6 HOURS PRN
COMMUNITY

## 2019-01-01 RX ORDER — CILOSTAZOL 100 MG/1
100 TABLET ORAL 2 TIMES DAILY
COMMUNITY

## 2019-01-01 RX ORDER — TETRACAINE HYDROCHLORIDE 5 MG/ML
2 SOLUTION OPHTHALMIC ONCE
Status: COMPLETED | OUTPATIENT
Start: 2019-01-01 | End: 2019-01-01

## 2019-01-01 RX ORDER — ATORVASTATIN CALCIUM 20 MG/1
20 TABLET, FILM COATED ORAL NIGHTLY
Status: DISCONTINUED | OUTPATIENT
Start: 2019-01-01 | End: 2019-01-01 | Stop reason: HOSPADM

## 2019-01-01 RX ORDER — KETOROLAC TROMETHAMINE 30 MG/ML
15 INJECTION, SOLUTION INTRAMUSCULAR; INTRAVENOUS 3 TIMES DAILY
Status: DISCONTINUED | OUTPATIENT
Start: 2019-01-01 | End: 2019-01-01

## 2019-01-01 RX ORDER — NICOTINE POLACRILEX 4 MG
15 LOZENGE BUCCAL PRN
Status: DISCONTINUED | OUTPATIENT
Start: 2019-01-01 | End: 2019-01-01 | Stop reason: HOSPADM

## 2019-01-01 RX ORDER — LEVETIRACETAM 500 MG/1
1000 TABLET ORAL 2 TIMES DAILY
Status: DISCONTINUED | OUTPATIENT
Start: 2019-01-01 | End: 2019-01-01 | Stop reason: HOSPADM

## 2019-01-01 RX ORDER — BUTALBITAL, ACETAMINOPHEN AND CAFFEINE 50; 325; 40 MG/1; MG/1; MG/1
1 TABLET ORAL EVERY 6 HOURS PRN
Status: DISCONTINUED | OUTPATIENT
Start: 2019-01-01 | End: 2019-01-01 | Stop reason: HOSPADM

## 2019-01-01 RX ORDER — BACLOFEN 10 MG/1
10 TABLET ORAL 2 TIMES DAILY
Status: DISCONTINUED | OUTPATIENT
Start: 2019-01-01 | End: 2019-01-01 | Stop reason: HOSPADM

## 2019-01-01 RX ORDER — LORAZEPAM 2 MG/ML
0.5 INJECTION INTRAMUSCULAR EVERY 6 HOURS
Status: DISCONTINUED | OUTPATIENT
Start: 2019-01-01 | End: 2019-01-01

## 2019-01-01 RX ORDER — ACETAMINOPHEN 80 MG
TABLET,CHEWABLE ORAL
Status: COMPLETED
Start: 2019-01-01 | End: 2019-01-01

## 2019-01-01 RX ORDER — ZONISAMIDE 100 MG/1
400 CAPSULE ORAL EVERY EVENING
Status: DISCONTINUED | OUTPATIENT
Start: 2019-01-01 | End: 2019-01-01 | Stop reason: HOSPADM

## 2019-01-01 RX ORDER — OXYBUTYNIN CHLORIDE 5 MG/1
5 TABLET ORAL 3 TIMES DAILY
Status: DISCONTINUED | OUTPATIENT
Start: 2019-01-01 | End: 2019-01-01 | Stop reason: HOSPADM

## 2019-01-01 RX ORDER — SODIUM CHLORIDE 9 MG/ML
INJECTION, SOLUTION INTRAVENOUS
Status: COMPLETED
Start: 2019-01-01 | End: 2019-01-01

## 2019-01-01 RX ORDER — KETOROLAC TROMETHAMINE 30 MG/ML
30 INJECTION, SOLUTION INTRAMUSCULAR; INTRAVENOUS ONCE
Status: COMPLETED | OUTPATIENT
Start: 2019-01-01 | End: 2019-01-01

## 2019-01-01 RX ORDER — METOPROLOL TARTRATE 5 MG/5ML
INJECTION INTRAVENOUS
Status: DISPENSED
Start: 2019-01-01 | End: 2019-01-01

## 2019-01-01 RX ORDER — BACITRACIN 500 [USP'U]/G
OINTMENT OPHTHALMIC
Status: DISCONTINUED | OUTPATIENT
Start: 2019-01-01 | End: 2019-01-01 | Stop reason: HOSPADM

## 2019-01-01 RX ORDER — DOCUSATE SODIUM 100 MG/1
100 CAPSULE, LIQUID FILLED ORAL 2 TIMES DAILY
Status: DISCONTINUED | OUTPATIENT
Start: 2019-01-01 | End: 2019-01-01 | Stop reason: HOSPADM

## 2019-01-01 RX ORDER — MORPHINE SULFATE/0.9% NACL/PF 1 MG/ML
SYRINGE (ML) INJECTION CONTINUOUS
Status: CANCELLED | OUTPATIENT
Start: 2019-01-01

## 2019-01-01 RX ORDER — MORPHINE SULFATE 4 MG/ML
2 INJECTION, SOLUTION INTRAMUSCULAR; INTRAVENOUS ONCE
Status: DISCONTINUED | OUTPATIENT
Start: 2019-01-01 | End: 2019-01-01

## 2019-01-01 RX ORDER — MORPHINE SULFATE/0.9% NACL/PF 1 MG/ML
SYRINGE (ML) INJECTION CONTINUOUS
Status: DISCONTINUED | OUTPATIENT
Start: 2019-01-01 | End: 2019-01-01 | Stop reason: HOSPADM

## 2019-01-01 RX ORDER — GABAPENTIN 300 MG/1
300 CAPSULE ORAL 3 TIMES DAILY
Status: DISCONTINUED | OUTPATIENT
Start: 2019-01-01 | End: 2019-01-01 | Stop reason: HOSPADM

## 2019-01-01 RX ORDER — TORSEMIDE 20 MG/1
20 TABLET ORAL EVERY OTHER DAY
Status: DISCONTINUED | OUTPATIENT
Start: 2019-01-01 | End: 2019-01-01 | Stop reason: HOSPADM

## 2019-01-01 RX ORDER — HYDROMORPHONE HCL 110MG/55ML
1 PATIENT CONTROLLED ANALGESIA SYRINGE INTRAVENOUS
Status: DISCONTINUED | OUTPATIENT
Start: 2019-01-01 | End: 2019-01-01

## 2019-01-01 RX ORDER — ACYCLOVIR 800 MG/1
800 TABLET ORAL
Qty: 50 TABLET | Refills: 0 | Status: SHIPPED | OUTPATIENT
Start: 2019-01-01 | End: 2019-01-01

## 2019-01-01 RX ORDER — HYDROCODONE BITARTRATE AND ACETAMINOPHEN 5; 325 MG/1; MG/1
2 TABLET ORAL EVERY 6 HOURS PRN
Status: DISCONTINUED | OUTPATIENT
Start: 2019-01-01 | End: 2019-01-01 | Stop reason: HOSPADM

## 2019-01-01 RX ORDER — HALOPERIDOL 5 MG/ML
2 INJECTION INTRAMUSCULAR EVERY 6 HOURS
Status: DISCONTINUED | OUTPATIENT
Start: 2019-01-01 | End: 2019-10-11 | Stop reason: HOSPADM

## 2019-01-01 RX ORDER — DOCUSATE SODIUM 100 MG/1
100 CAPSULE, LIQUID FILLED ORAL 2 TIMES DAILY
COMMUNITY

## 2019-01-01 RX ORDER — METOPROLOL TARTRATE 5 MG/5ML
2.5 INJECTION INTRAVENOUS EVERY 6 HOURS
Status: DISCONTINUED | OUTPATIENT
Start: 2019-01-01 | End: 2019-01-01 | Stop reason: HOSPADM

## 2019-01-01 RX ORDER — ACETAMINOPHEN 325 MG/1
650 TABLET ORAL EVERY 4 HOURS PRN
Status: DISCONTINUED | OUTPATIENT
Start: 2019-01-01 | End: 2019-10-11 | Stop reason: HOSPADM

## 2019-01-01 RX ORDER — ZONISAMIDE 100 MG/1
400 CAPSULE ORAL NIGHTLY
Status: DISCONTINUED | OUTPATIENT
Start: 2019-01-01 | End: 2019-01-01

## 2019-01-01 RX ORDER — ZONISAMIDE 100 MG/1
400 CAPSULE ORAL EVERY EVENING
COMMUNITY

## 2019-01-01 RX ORDER — DIPHENHYDRAMINE HCL 25 MG
25 TABLET ORAL ONCE
Status: COMPLETED | OUTPATIENT
Start: 2019-01-01 | End: 2019-01-01

## 2019-01-01 RX ORDER — SODIUM CHLORIDE 9 MG/ML
INJECTION, SOLUTION INTRAVENOUS CONTINUOUS
Status: DISCONTINUED | OUTPATIENT
Start: 2019-01-01 | End: 2019-10-11 | Stop reason: HOSPADM

## 2019-01-01 RX ORDER — KETOROLAC TROMETHAMINE 30 MG/ML
15 INJECTION, SOLUTION INTRAMUSCULAR; INTRAVENOUS ONCE
Status: COMPLETED | OUTPATIENT
Start: 2019-01-01 | End: 2019-01-01

## 2019-01-01 RX ORDER — PANTOPRAZOLE SODIUM 40 MG/10ML
40 INJECTION, POWDER, LYOPHILIZED, FOR SOLUTION INTRAVENOUS DAILY
Status: DISCONTINUED | OUTPATIENT
Start: 2019-01-01 | End: 2019-01-01

## 2019-01-01 RX ORDER — SODIUM CHLORIDE 0.9 % (FLUSH) 0.9 %
10 SYRINGE (ML) INJECTION EVERY 12 HOURS SCHEDULED
Status: CANCELLED | OUTPATIENT
Start: 2019-01-01

## 2019-01-01 RX ORDER — LORAZEPAM 2 MG/ML
1 INJECTION INTRAMUSCULAR EVERY 6 HOURS
Status: DISCONTINUED | OUTPATIENT
Start: 2019-01-01 | End: 2019-10-11 | Stop reason: HOSPADM

## 2019-01-01 RX ORDER — DIPHENHYDRAMINE HYDROCHLORIDE 50 MG/ML
50 INJECTION INTRAMUSCULAR; INTRAVENOUS
Status: COMPLETED | OUTPATIENT
Start: 2019-01-01 | End: 2019-01-01

## 2019-01-01 RX ORDER — FUROSEMIDE 10 MG/ML
40 INJECTION INTRAMUSCULAR; INTRAVENOUS DAILY
Status: DISCONTINUED | OUTPATIENT
Start: 2019-01-01 | End: 2019-10-11 | Stop reason: HOSPADM

## 2019-01-01 RX ORDER — DEXAMETHASONE SODIUM PHOSPHATE 4 MG/ML
4 INJECTION, SOLUTION INTRA-ARTICULAR; INTRALESIONAL; INTRAMUSCULAR; INTRAVENOUS; SOFT TISSUE EVERY 12 HOURS
Status: DISCONTINUED | OUTPATIENT
Start: 2019-01-01 | End: 2019-10-11 | Stop reason: HOSPADM

## 2019-01-01 RX ORDER — ONDANSETRON 4 MG/1
4 TABLET, ORALLY DISINTEGRATING ORAL EVERY 8 HOURS PRN
Status: DISCONTINUED | OUTPATIENT
Start: 2019-01-01 | End: 2019-01-01 | Stop reason: HOSPADM

## 2019-01-01 RX ORDER — HYDROCODONE BITARTRATE AND ACETAMINOPHEN 5; 325 MG/1; MG/1
1 TABLET ORAL EVERY 6 HOURS PRN
Status: DISCONTINUED | OUTPATIENT
Start: 2019-01-01 | End: 2019-01-01

## 2019-01-01 RX ORDER — METOCLOPRAMIDE HYDROCHLORIDE 5 MG/ML
2.5 INJECTION INTRAMUSCULAR; INTRAVENOUS EVERY 6 HOURS
Status: DISCONTINUED | OUTPATIENT
Start: 2019-01-01 | End: 2019-01-01

## 2019-01-01 RX ORDER — GREEN TEA/HOODIA GORDONII 315-12.5MG
1 CAPSULE ORAL 2 TIMES DAILY
Qty: 60 TABLET | Refills: 0 | Status: SHIPPED | OUTPATIENT
Start: 2019-01-01 | End: 2019-01-01

## 2019-01-01 RX ORDER — GABAPENTIN 300 MG/1
300 CAPSULE ORAL 2 TIMES DAILY
Status: DISCONTINUED | OUTPATIENT
Start: 2019-01-01 | End: 2019-01-01 | Stop reason: HOSPADM

## 2019-01-01 RX ORDER — CHOLESTYRAMINE LIGHT 4 G/5.7G
1 POWDER, FOR SUSPENSION ORAL EVERY 12 HOURS PRN
Status: DISCONTINUED | OUTPATIENT
Start: 2019-01-01 | End: 2019-01-01 | Stop reason: HOSPADM

## 2019-01-01 RX ORDER — MIDODRINE HYDROCHLORIDE 10 MG/1
10 TABLET ORAL 3 TIMES DAILY PRN
Qty: 30 TABLET | Refills: 0 | Status: SHIPPED
Start: 2019-01-01

## 2019-01-01 RX ORDER — HYDROMORPHONE HCL 110MG/55ML
1 PATIENT CONTROLLED ANALGESIA SYRINGE INTRAVENOUS SEE ADMIN INSTRUCTIONS
Status: DISCONTINUED | OUTPATIENT
Start: 2019-01-01 | End: 2019-10-11 | Stop reason: HOSPADM

## 2019-01-01 RX ORDER — SODIUM CHLORIDE 9 MG/ML
INJECTION, SOLUTION INTRAVENOUS CONTINUOUS
Status: DISCONTINUED | OUTPATIENT
Start: 2019-01-01 | End: 2019-01-01

## 2019-01-01 RX ORDER — HYDROCODONE BITARTRATE AND ACETAMINOPHEN 5; 325 MG/1; MG/1
1 TABLET ORAL EVERY 4 HOURS PRN
Qty: 8 TABLET | Refills: 0 | Status: SHIPPED | OUTPATIENT
Start: 2019-01-01 | End: 2019-01-01

## 2019-01-01 RX ORDER — METOPROLOL TARTRATE 50 MG/1
100 TABLET, FILM COATED ORAL 2 TIMES DAILY
Status: DISCONTINUED | OUTPATIENT
Start: 2019-01-01 | End: 2019-01-01

## 2019-01-01 RX ORDER — ACETAMINOPHEN 325 MG/1
650 TABLET ORAL EVERY 4 HOURS PRN
Status: CANCELLED | OUTPATIENT
Start: 2019-01-01

## 2019-01-01 RX ORDER — MORPHINE SULFATE 15 MG/1
15 TABLET ORAL EVERY 4 HOURS PRN
Status: DISCONTINUED | OUTPATIENT
Start: 2019-01-01 | End: 2019-01-01 | Stop reason: HOSPADM

## 2019-01-01 RX ORDER — DIPHENHYDRAMINE HYDROCHLORIDE, ZINC ACETATE 2; .1 G/100G; G/100G
CREAM TOPICAL 3 TIMES DAILY PRN
Status: DISCONTINUED | OUTPATIENT
Start: 2019-01-01 | End: 2019-01-01 | Stop reason: HOSPADM

## 2019-01-01 RX ORDER — NALOXONE HYDROCHLORIDE 0.4 MG/ML
0.4 INJECTION, SOLUTION INTRAMUSCULAR; INTRAVENOUS; SUBCUTANEOUS PRN
Status: DISCONTINUED | OUTPATIENT
Start: 2019-01-01 | End: 2019-10-11 | Stop reason: HOSPADM

## 2019-01-01 RX ORDER — HYDROMORPHONE HCL 110MG/55ML
1 PATIENT CONTROLLED ANALGESIA SYRINGE INTRAVENOUS
Status: DISCONTINUED | OUTPATIENT
Start: 2019-01-01 | End: 2019-10-11 | Stop reason: HOSPADM

## 2019-01-01 RX ORDER — SODIUM CHLORIDE 0.9 % (FLUSH) 0.9 %
10 SYRINGE (ML) INJECTION PRN
Status: CANCELLED | OUTPATIENT
Start: 2019-01-01

## 2019-01-01 RX ORDER — SODIUM CHLORIDE 0.9 % (FLUSH) 0.9 %
10 SYRINGE (ML) INJECTION PRN
Status: DISCONTINUED | OUTPATIENT
Start: 2019-01-01 | End: 2019-10-11 | Stop reason: HOSPADM

## 2019-01-01 RX ORDER — HYDROMORPHONE HCL 110MG/55ML
0.5 PATIENT CONTROLLED ANALGESIA SYRINGE INTRAVENOUS
Status: DISCONTINUED | OUTPATIENT
Start: 2019-01-01 | End: 2019-01-01

## 2019-01-01 RX ORDER — SERTRALINE HYDROCHLORIDE 25 MG/1
75 TABLET, FILM COATED ORAL DAILY
Status: DISCONTINUED | OUTPATIENT
Start: 2019-01-01 | End: 2019-01-01 | Stop reason: HOSPADM

## 2019-01-01 RX ORDER — HALOPERIDOL 5 MG/ML
1 INJECTION INTRAMUSCULAR 4 TIMES DAILY
Status: DISCONTINUED | OUTPATIENT
Start: 2019-01-01 | End: 2019-01-01

## 2019-01-01 RX ORDER — SODIUM CHLORIDE 9 MG/ML
INJECTION, SOLUTION INTRAVENOUS CONTINUOUS
Status: DISPENSED | OUTPATIENT
Start: 2019-01-01 | End: 2019-01-01

## 2019-01-01 RX ORDER — SODIUM CHLORIDE 0.9 % (FLUSH) 0.9 %
10 SYRINGE (ML) INJECTION EVERY 12 HOURS SCHEDULED
Status: DISCONTINUED | OUTPATIENT
Start: 2019-01-01 | End: 2019-10-11 | Stop reason: HOSPADM

## 2019-01-01 RX ORDER — KETOROLAC TROMETHAMINE 30 MG/ML
15 INJECTION, SOLUTION INTRAMUSCULAR; INTRAVENOUS EVERY 6 HOURS
Status: COMPLETED | OUTPATIENT
Start: 2019-01-01 | End: 2019-01-01

## 2019-01-01 RX ORDER — CHOLESTYRAMINE LIGHT 4 G/5.7G
4 POWDER, FOR SUSPENSION ORAL EVERY 12 HOURS PRN
Status: DISCONTINUED | OUTPATIENT
Start: 2019-01-01 | End: 2019-01-01 | Stop reason: HOSPADM

## 2019-01-01 RX ORDER — METHADONE HYDROCHLORIDE 10 MG/ML
2.5 CONCENTRATE ORAL EVERY 6 HOURS
Status: DISCONTINUED | OUTPATIENT
Start: 2019-01-01 | End: 2019-01-01

## 2019-01-01 RX ORDER — ATORVASTATIN CALCIUM 20 MG/1
20 TABLET, FILM COATED ORAL NIGHTLY
Status: DISCONTINUED | OUTPATIENT
Start: 2019-01-01 | End: 2019-01-01

## 2019-01-01 RX ORDER — MAGNESIUM SULFATE IN WATER 40 MG/ML
2 INJECTION, SOLUTION INTRAVENOUS ONCE
Status: COMPLETED | OUTPATIENT
Start: 2019-01-01 | End: 2019-01-01

## 2019-01-01 RX ORDER — LORAZEPAM 2 MG/ML
0.5 INJECTION INTRAMUSCULAR EVERY 4 HOURS PRN
Status: DISCONTINUED | OUTPATIENT
Start: 2019-01-01 | End: 2019-10-11 | Stop reason: HOSPADM

## 2019-01-01 RX ORDER — DEXAMETHASONE 4 MG/1
4 TABLET ORAL 2 TIMES DAILY
Status: DISCONTINUED | OUTPATIENT
Start: 2019-01-01 | End: 2019-01-01

## 2019-01-01 RX ORDER — NALOXONE HYDROCHLORIDE 0.4 MG/ML
0.4 INJECTION, SOLUTION INTRAMUSCULAR; INTRAVENOUS; SUBCUTANEOUS PRN
Status: DISCONTINUED | OUTPATIENT
Start: 2019-01-01 | End: 2019-01-01 | Stop reason: HOSPADM

## 2019-01-01 RX ADMIN — MORPHINE SULFATE 30 MG: 1 INJECTION INTRAVENOUS at 00:23

## 2019-01-01 RX ADMIN — DOCUSATE SODIUM 100 MG: 100 CAPSULE, LIQUID FILLED ORAL at 23:05

## 2019-01-01 RX ADMIN — HALOPERIDOL LACTATE 1 MG: 5 INJECTION, SOLUTION INTRAMUSCULAR at 19:56

## 2019-01-01 RX ADMIN — KETOROLAC TROMETHAMINE 15 MG: 30 INJECTION, SOLUTION INTRAMUSCULAR at 03:07

## 2019-01-01 RX ADMIN — DOCUSATE SODIUM 100 MG: 100 CAPSULE, LIQUID FILLED ORAL at 23:21

## 2019-01-01 RX ADMIN — SODIUM CHLORIDE: 9 INJECTION, SOLUTION INTRAVENOUS at 20:32

## 2019-01-01 RX ADMIN — HYDROCODONE BITARTRATE AND ACETAMINOPHEN 1 TABLET: 5; 325 TABLET ORAL at 21:14

## 2019-01-01 RX ADMIN — ONDANSETRON 4 MG: 4 TABLET, ORALLY DISINTEGRATING ORAL at 17:11

## 2019-01-01 RX ADMIN — HYDROCODONE BITARTRATE AND ACETAMINOPHEN 1 TABLET: 5; 325 TABLET ORAL at 17:36

## 2019-01-01 RX ADMIN — CILOSTAZOL 100 MG: 100 TABLET ORAL at 23:29

## 2019-01-01 RX ADMIN — Medication 0.2 MG: at 03:01

## 2019-01-01 RX ADMIN — ZONISAMIDE 400 MG: 100 CAPSULE ORAL at 20:59

## 2019-01-01 RX ADMIN — Medication 10 ML: at 21:03

## 2019-01-01 RX ADMIN — BACLOFEN 10 MG: 10 TABLET ORAL at 09:08

## 2019-01-01 RX ADMIN — BACITRACIN: 500 OINTMENT OPHTHALMIC at 05:02

## 2019-01-01 RX ADMIN — GABAPENTIN 300 MG: 300 CAPSULE ORAL at 21:00

## 2019-01-01 RX ADMIN — HYDROMORPHONE HYDROCHLORIDE 1 MG: 2 INJECTION, SOLUTION INTRAMUSCULAR; INTRAVENOUS; SUBCUTANEOUS at 10:35

## 2019-01-01 RX ADMIN — HALOPERIDOL LACTATE 1 MG: 5 INJECTION, SOLUTION INTRAMUSCULAR at 11:00

## 2019-01-01 RX ADMIN — SERTRALINE HYDROCHLORIDE 75 MG: 50 TABLET ORAL at 08:56

## 2019-01-01 RX ADMIN — CEFTRIAXONE SODIUM 1 G: 1 INJECTION, POWDER, FOR SOLUTION INTRAMUSCULAR; INTRAVENOUS at 00:02

## 2019-01-01 RX ADMIN — IOPAMIDOL 75 ML: 755 INJECTION, SOLUTION INTRAVENOUS at 19:55

## 2019-01-01 RX ADMIN — ONDANSETRON 4 MG: 4 TABLET, ORALLY DISINTEGRATING ORAL at 17:58

## 2019-01-01 RX ADMIN — DIPHENHYDRAMINE HYDROCHLORIDE, ZINC ACETATE: 2; .1 CREAM TOPICAL at 11:48

## 2019-01-01 RX ADMIN — OYSTER SHELL CALCIUM WITH VITAMIN D 1 TABLET: 500; 200 TABLET, FILM COATED ORAL at 09:25

## 2019-01-01 RX ADMIN — LORAZEPAM 1 MG: 2 INJECTION INTRAMUSCULAR; INTRAVENOUS at 02:48

## 2019-01-01 RX ADMIN — Medication 10 ML: at 08:22

## 2019-01-01 RX ADMIN — Medication 10 ML: at 09:55

## 2019-01-01 RX ADMIN — OXYBUTYNIN CHLORIDE 5 MG: 5 TABLET ORAL at 20:33

## 2019-01-01 RX ADMIN — DOCUSATE SODIUM 100 MG: 100 CAPSULE, LIQUID FILLED ORAL at 23:29

## 2019-01-01 RX ADMIN — LEVETIRACETAM 1500 MG: 500 TABLET, FILM COATED ORAL at 08:50

## 2019-01-01 RX ADMIN — OYSTER SHELL CALCIUM WITH VITAMIN D 1 TABLET: 500; 200 TABLET, FILM COATED ORAL at 10:15

## 2019-01-01 RX ADMIN — METHADONE HYDROCHLORIDE 3 MG: 10 CONCENTRATE ORAL at 12:42

## 2019-01-01 RX ADMIN — Medication 5 MG: at 17:39

## 2019-01-01 RX ADMIN — HALOPERIDOL LACTATE 1 MG: 5 INJECTION, SOLUTION INTRAMUSCULAR at 09:20

## 2019-01-01 RX ADMIN — DEXAMETHASONE SODIUM PHOSPHATE 4 MG: 4 INJECTION, SOLUTION INTRAMUSCULAR; INTRAVENOUS at 11:47

## 2019-01-01 RX ADMIN — ATORVASTATIN CALCIUM 20 MG: 20 TABLET, FILM COATED ORAL at 22:36

## 2019-01-01 RX ADMIN — OXYBUTYNIN CHLORIDE 5 MG: 5 TABLET ORAL at 10:02

## 2019-01-01 RX ADMIN — GABAPENTIN 300 MG: 300 CAPSULE ORAL at 20:33

## 2019-01-01 RX ADMIN — BACITRACIN: 500 OINTMENT OPHTHALMIC at 20:35

## 2019-01-01 RX ADMIN — ZONISAMIDE 400 MG: 100 CAPSULE ORAL at 20:57

## 2019-01-01 RX ADMIN — BACITRACIN: 500 OINTMENT OPHTHALMIC at 15:17

## 2019-01-01 RX ADMIN — LORAZEPAM 1 MG: 2 INJECTION INTRAMUSCULAR; INTRAVENOUS at 14:14

## 2019-01-01 RX ADMIN — OYSTER SHELL CALCIUM WITH VITAMIN D 1 TABLET: 500; 200 TABLET, FILM COATED ORAL at 20:59

## 2019-01-01 RX ADMIN — BACITRACIN: 500 OINTMENT OPHTHALMIC at 06:42

## 2019-01-01 RX ADMIN — MIDODRINE HYDROCHLORIDE 10 MG: 5 TABLET ORAL at 11:55

## 2019-01-01 RX ADMIN — KETOROLAC TROMETHAMINE 15 MG: 30 INJECTION, SOLUTION INTRAMUSCULAR at 04:28

## 2019-01-01 RX ADMIN — FLUORESCEIN SODIUM 1 MG: 1 STRIP OPHTHALMIC at 17:48

## 2019-01-01 RX ADMIN — OXYBUTYNIN CHLORIDE 5 MG: 5 TABLET ORAL at 22:35

## 2019-01-01 RX ADMIN — METOPROLOL TARTRATE 25 MG: 25 TABLET ORAL at 20:33

## 2019-01-01 RX ADMIN — INSULIN LISPRO 1 UNITS: 100 INJECTION, SOLUTION INTRAVENOUS; SUBCUTANEOUS at 20:49

## 2019-01-01 RX ADMIN — ZONISAMIDE 400 MG: 100 CAPSULE ORAL at 20:02

## 2019-01-01 RX ADMIN — BACLOFEN 10 MG: 10 TABLET ORAL at 08:49

## 2019-01-01 RX ADMIN — FAMOTIDINE 10 MG: 20 TABLET ORAL at 21:14

## 2019-01-01 RX ADMIN — SODIUM CHLORIDE: 9 INJECTION, SOLUTION INTRAVENOUS at 06:47

## 2019-01-01 RX ADMIN — GABAPENTIN 300 MG: 300 CAPSULE ORAL at 20:56

## 2019-01-01 RX ADMIN — OXYBUTYNIN CHLORIDE 5 MG: 5 TABLET ORAL at 15:15

## 2019-01-01 RX ADMIN — PANTOPRAZOLE SODIUM 40 MG: 40 INJECTION, POWDER, FOR SOLUTION INTRAVENOUS at 10:01

## 2019-01-01 RX ADMIN — GABAPENTIN 300 MG: 300 CAPSULE ORAL at 09:39

## 2019-01-01 RX ADMIN — BACITRACIN: 500 OINTMENT OPHTHALMIC at 01:24

## 2019-01-01 RX ADMIN — LORAZEPAM 0.5 MG: 2 INJECTION INTRAMUSCULAR; INTRAVENOUS at 02:39

## 2019-01-01 RX ADMIN — GABAPENTIN 300 MG: 300 CAPSULE ORAL at 08:56

## 2019-01-01 RX ADMIN — LORAZEPAM 1 MG: 2 INJECTION INTRAMUSCULAR; INTRAVENOUS at 08:22

## 2019-01-01 RX ADMIN — ACYCLOVIR SODIUM 500 MG: 500 INJECTION, SOLUTION INTRAVENOUS at 09:15

## 2019-01-01 RX ADMIN — MORPHINE SULFATE 3 MG: 4 INJECTION, SOLUTION INTRAMUSCULAR; INTRAVENOUS at 22:46

## 2019-01-01 RX ADMIN — ATORVASTATIN CALCIUM 20 MG: 20 TABLET, FILM COATED ORAL at 20:54

## 2019-01-01 RX ADMIN — METOCLOPRAMIDE 2.5 MG: 5 INJECTION, SOLUTION INTRAMUSCULAR; INTRAVENOUS at 17:27

## 2019-01-01 RX ADMIN — HYDROCODONE BITARTRATE AND ACETAMINOPHEN 2 TABLET: 5; 325 TABLET ORAL at 16:45

## 2019-01-01 RX ADMIN — MIDODRINE HYDROCHLORIDE 10 MG: 5 TABLET ORAL at 09:48

## 2019-01-01 RX ADMIN — Medication 10 ML: at 08:18

## 2019-01-01 RX ADMIN — ZONISAMIDE 400 MG: 100 CAPSULE ORAL at 17:53

## 2019-01-01 RX ADMIN — GABAPENTIN 300 MG: 300 CAPSULE ORAL at 08:51

## 2019-01-01 RX ADMIN — ACETAMINOPHEN 650 MG: 325 TABLET ORAL at 09:39

## 2019-01-01 RX ADMIN — VITAMIN D, TAB 1000IU (100/BT) 5000 UNITS: 25 TAB at 09:53

## 2019-01-01 RX ADMIN — DOCUSATE SODIUM 100 MG: 100 CAPSULE, LIQUID FILLED ORAL at 09:49

## 2019-01-01 RX ADMIN — SODIUM CHLORIDE, PRESERVATIVE FREE 10 ML: 5 INJECTION INTRAVENOUS at 08:46

## 2019-01-01 RX ADMIN — GABAPENTIN 300 MG: 300 CAPSULE ORAL at 22:36

## 2019-01-01 RX ADMIN — LEVETIRACETAM 1000 MG: 100 INJECTION, SOLUTION INTRAVENOUS at 23:41

## 2019-01-01 RX ADMIN — MORPHINE SULFATE 3 MG: 4 INJECTION, SOLUTION INTRAMUSCULAR; INTRAVENOUS at 00:50

## 2019-01-01 RX ADMIN — SODIUM CHLORIDE 1000 ML: 9 INJECTION, SOLUTION INTRAVENOUS at 14:43

## 2019-01-01 RX ADMIN — LEVETIRACETAM 1000 MG: 500 TABLET, FILM COATED ORAL at 09:54

## 2019-01-01 RX ADMIN — BACITRACIN: 500 OINTMENT OPHTHALMIC at 08:55

## 2019-01-01 RX ADMIN — MORPHINE SULFATE 1 MG: 4 INJECTION, SOLUTION INTRAMUSCULAR; INTRAVENOUS at 13:58

## 2019-01-01 RX ADMIN — Medication 0.3 MG: at 02:30

## 2019-01-01 RX ADMIN — ENOXAPARIN SODIUM 40 MG: 40 INJECTION SUBCUTANEOUS at 08:55

## 2019-01-01 RX ADMIN — HALOPERIDOL LACTATE 1 MG: 5 INJECTION, SOLUTION INTRAMUSCULAR at 11:41

## 2019-01-01 RX ADMIN — Medication 5 MG: at 12:11

## 2019-01-01 RX ADMIN — Medication 5 MG: at 17:40

## 2019-01-01 RX ADMIN — CILOSTAZOL 100 MG: 100 TABLET ORAL at 09:52

## 2019-01-01 RX ADMIN — BACLOFEN 10 MG: 10 TABLET ORAL at 09:39

## 2019-01-01 RX ADMIN — DOCUSATE SODIUM 100 MG: 100 CAPSULE, LIQUID FILLED ORAL at 09:08

## 2019-01-01 RX ADMIN — HALOPERIDOL LACTATE 2 MG: 5 INJECTION, SOLUTION INTRAMUSCULAR at 20:52

## 2019-01-01 RX ADMIN — Medication: at 23:25

## 2019-01-01 RX ADMIN — HALOPERIDOL LACTATE 1 MG: 5 INJECTION, SOLUTION INTRAMUSCULAR at 21:17

## 2019-01-01 RX ADMIN — VANCOMYCIN HYDROCHLORIDE 750 MG: 5 INJECTION, POWDER, LYOPHILIZED, FOR SOLUTION INTRAVENOUS at 11:50

## 2019-01-01 RX ADMIN — LEVETIRACETAM 1500 MG: 500 TABLET, FILM COATED ORAL at 08:46

## 2019-01-01 RX ADMIN — HALOPERIDOL LACTATE 2 MG: 5 INJECTION, SOLUTION INTRAMUSCULAR at 14:52

## 2019-01-01 RX ADMIN — BACITRACIN: 500 OINTMENT OPHTHALMIC at 20:45

## 2019-01-01 RX ADMIN — ACETAMINOPHEN 650 MG: 325 TABLET ORAL at 14:41

## 2019-01-01 RX ADMIN — GABAPENTIN 300 MG: 300 CAPSULE ORAL at 13:59

## 2019-01-01 RX ADMIN — CILOSTAZOL 100 MG: 100 TABLET ORAL at 09:08

## 2019-01-01 RX ADMIN — OYSTER SHELL CALCIUM WITH VITAMIN D 1 TABLET: 500; 200 TABLET, FILM COATED ORAL at 15:33

## 2019-01-01 RX ADMIN — OXYBUTYNIN CHLORIDE 5 MG: 5 TABLET ORAL at 09:14

## 2019-01-01 RX ADMIN — MORPHINE SULFATE 3 MG: 4 INJECTION, SOLUTION INTRAMUSCULAR; INTRAVENOUS at 17:18

## 2019-01-01 RX ADMIN — DIPHENHYDRAMINE HYDROCHLORIDE 25 MG: 50 INJECTION INTRAMUSCULAR; INTRAVENOUS at 19:24

## 2019-01-01 RX ADMIN — PANTOPRAZOLE SODIUM 40 MG: 40 INJECTION, POWDER, FOR SOLUTION INTRAVENOUS at 22:35

## 2019-01-01 RX ADMIN — LORAZEPAM 0.5 MG: 2 INJECTION, SOLUTION INTRAMUSCULAR; INTRAVENOUS at 14:17

## 2019-01-01 RX ADMIN — PANTOPRAZOLE SODIUM 40 MG: 40 INJECTION, POWDER, FOR SOLUTION INTRAVENOUS at 23:30

## 2019-01-01 RX ADMIN — IOPAMIDOL 80 ML: 755 INJECTION, SOLUTION INTRAVENOUS at 23:55

## 2019-01-01 RX ADMIN — METHADONE HYDROCHLORIDE 3 MG: 10 CONCENTRATE ORAL at 17:30

## 2019-01-01 RX ADMIN — DOCUSATE SODIUM 100 MG: 100 CAPSULE, LIQUID FILLED ORAL at 08:50

## 2019-01-01 RX ADMIN — LEVETIRACETAM 1000 MG: 100 INJECTION, SOLUTION INTRAVENOUS at 10:54

## 2019-01-01 RX ADMIN — ACYCLOVIR SODIUM 750 MG: 50 INJECTION, SOLUTION INTRAVENOUS at 17:48

## 2019-01-01 RX ADMIN — SODIUM CHLORIDE 10 ML: 9 INJECTION, SOLUTION INTRAMUSCULAR; INTRAVENOUS; SUBCUTANEOUS at 12:04

## 2019-01-01 RX ADMIN — HALOPERIDOL LACTATE 2 MG: 5 INJECTION, SOLUTION INTRAMUSCULAR at 15:22

## 2019-01-01 RX ADMIN — STANDARDIZED SENNA CONCENTRATE 17.2 MG: 8.6 TABLET ORAL at 12:30

## 2019-01-01 RX ADMIN — TORSEMIDE 20 MG: 20 TABLET ORAL at 09:50

## 2019-01-01 RX ADMIN — SERTRALINE HYDROCHLORIDE 75 MG: 50 TABLET ORAL at 09:56

## 2019-01-01 RX ADMIN — GABAPENTIN 300 MG: 300 CAPSULE ORAL at 08:49

## 2019-01-01 RX ADMIN — LEVETIRACETAM 1000 MG: 100 INJECTION, SOLUTION INTRAVENOUS at 01:14

## 2019-01-01 RX ADMIN — ENOXAPARIN SODIUM 40 MG: 40 INJECTION SUBCUTANEOUS at 09:58

## 2019-01-01 RX ADMIN — PANTOPRAZOLE SODIUM 40 MG: 40 INJECTION, POWDER, FOR SOLUTION INTRAVENOUS at 08:19

## 2019-01-01 RX ADMIN — MIDODRINE HYDROCHLORIDE 10 MG: 5 TABLET ORAL at 17:27

## 2019-01-01 RX ADMIN — GABAPENTIN 300 MG: 300 CAPSULE ORAL at 08:21

## 2019-01-01 RX ADMIN — NORTRIPTYLINE HYDROCHLORIDE 50 MG: 25 CAPSULE ORAL at 22:36

## 2019-01-01 RX ADMIN — DEXAMETHASONE SODIUM PHOSPHATE 4 MG: 4 INJECTION, SOLUTION INTRAMUSCULAR; INTRAVENOUS at 22:00

## 2019-01-01 RX ADMIN — Medication 5 MG: at 06:42

## 2019-01-01 RX ADMIN — METOPROLOL TARTRATE 2.5 MG: 5 INJECTION INTRAVENOUS at 02:11

## 2019-01-01 RX ADMIN — HYDROCODONE BITARTRATE AND ACETAMINOPHEN 1 TABLET: 5; 325 TABLET ORAL at 01:18

## 2019-01-01 RX ADMIN — SODIUM CHLORIDE: 9 INJECTION, SOLUTION INTRAVENOUS at 18:27

## 2019-01-01 RX ADMIN — DOCUSATE SODIUM 100 MG: 100 CAPSULE, LIQUID FILLED ORAL at 10:02

## 2019-01-01 RX ADMIN — METOPROLOL TARTRATE 25 MG: 25 TABLET ORAL at 08:51

## 2019-01-01 RX ADMIN — BACLOFEN 20 MG: 10 TABLET ORAL at 13:31

## 2019-01-01 RX ADMIN — METOPROLOL TARTRATE 25 MG: 25 TABLET ORAL at 09:14

## 2019-01-01 RX ADMIN — LEVETIRACETAM 1000 MG: 100 INJECTION, SOLUTION INTRAVENOUS at 00:04

## 2019-01-01 RX ADMIN — Medication 0.8 MG: at 14:30

## 2019-01-01 RX ADMIN — LORAZEPAM 1 MG: 2 INJECTION INTRAMUSCULAR; INTRAVENOUS at 10:47

## 2019-01-01 RX ADMIN — BACITRACIN: 500 OINTMENT OPHTHALMIC at 03:59

## 2019-01-01 RX ADMIN — DEXAMETHASONE SODIUM PHOSPHATE 4 MG: 4 INJECTION, SOLUTION INTRAMUSCULAR; INTRAVENOUS at 23:46

## 2019-01-01 RX ADMIN — SERTRALINE 75 MG: 25 TABLET, FILM COATED ORAL at 09:14

## 2019-01-01 RX ADMIN — HALOPERIDOL LACTATE 1 MG: 5 INJECTION, SOLUTION INTRAMUSCULAR at 12:22

## 2019-01-01 RX ADMIN — ACYCLOVIR SODIUM 500 MG: 500 INJECTION, SOLUTION INTRAVENOUS at 04:16

## 2019-01-01 RX ADMIN — MORPHINE SULFATE 3 MG: 4 INJECTION, SOLUTION INTRAMUSCULAR; INTRAVENOUS at 22:52

## 2019-01-01 RX ADMIN — BUTALBITAL, ACETAMINOPHEN AND CAFFEINE 1 TABLET: 50; 325; 40 TABLET ORAL at 23:56

## 2019-01-01 RX ADMIN — Medication 10 ML: at 10:00

## 2019-01-01 RX ADMIN — LEVETIRACETAM 1000 MG: 500 TABLET, FILM COATED ORAL at 09:08

## 2019-01-01 RX ADMIN — MORPHINE SULFATE 3 MG: 4 INJECTION, SOLUTION INTRAMUSCULAR; INTRAVENOUS at 05:50

## 2019-01-01 RX ADMIN — ACETAMINOPHEN 650 MG: 325 TABLET ORAL at 23:22

## 2019-01-01 RX ADMIN — LORAZEPAM 1 MG: 2 INJECTION INTRAMUSCULAR; INTRAVENOUS at 20:48

## 2019-01-01 RX ADMIN — Medication 0.3 MG: at 13:49

## 2019-01-01 RX ADMIN — SERTRALINE HYDROCHLORIDE 75 MG: 50 TABLET ORAL at 10:01

## 2019-01-01 RX ADMIN — Medication 5 MG: at 06:06

## 2019-01-01 RX ADMIN — Medication 5 MG: at 19:28

## 2019-01-01 RX ADMIN — MORPHINE SULFATE 4 MG: 4 INJECTION, SOLUTION INTRAMUSCULAR; INTRAVENOUS at 07:08

## 2019-01-01 RX ADMIN — METHADONE HYDROCHLORIDE 3 MG: 10 CONCENTRATE ORAL at 01:15

## 2019-01-01 RX ADMIN — MORPHINE SULFATE 1 MG: 1 INJECTION INTRAVENOUS at 00:51

## 2019-01-01 RX ADMIN — POTASSIUM CHLORIDE 40 MEQ: 750 TABLET, FILM COATED, EXTENDED RELEASE ORAL at 15:56

## 2019-01-01 RX ADMIN — LEVETIRACETAM 1500 MG: 500 TABLET ORAL at 10:12

## 2019-01-01 RX ADMIN — CILOSTAZOL 100 MG: 100 TABLET ORAL at 20:34

## 2019-01-01 RX ADMIN — ATORVASTATIN CALCIUM 20 MG: 20 TABLET, FILM COATED ORAL at 20:59

## 2019-01-01 RX ADMIN — LEVETIRACETAM 1500 MG: 500 TABLET ORAL at 09:38

## 2019-01-01 RX ADMIN — BACITRACIN: 500 OINTMENT OPHTHALMIC at 12:29

## 2019-01-01 RX ADMIN — BACLOFEN 10 MG: 10 TABLET ORAL at 09:49

## 2019-01-01 RX ADMIN — BACITRACIN: 500 OINTMENT OPHTHALMIC at 11:50

## 2019-01-01 RX ADMIN — HYDROMORPHONE HYDROCHLORIDE 1 MG: 2 INJECTION, SOLUTION INTRAMUSCULAR; INTRAVENOUS; SUBCUTANEOUS at 19:39

## 2019-01-01 RX ADMIN — GABAPENTIN 300 MG: 300 CAPSULE ORAL at 23:30

## 2019-01-01 RX ADMIN — ONDANSETRON 4 MG: 4 TABLET, ORALLY DISINTEGRATING ORAL at 13:44

## 2019-01-01 RX ADMIN — SERTRALINE 75 MG: 25 TABLET, FILM COATED ORAL at 09:55

## 2019-01-01 RX ADMIN — METOCLOPRAMIDE 2.5 MG: 5 INJECTION, SOLUTION INTRAMUSCULAR; INTRAVENOUS at 17:14

## 2019-01-01 RX ADMIN — LORAZEPAM 1 MG: 2 INJECTION INTRAMUSCULAR; INTRAVENOUS at 20:52

## 2019-01-01 RX ADMIN — LEVETIRACETAM 1000 MG: 100 INJECTION, SOLUTION INTRAVENOUS at 11:48

## 2019-01-01 RX ADMIN — Medication 5 MG: at 05:52

## 2019-01-01 RX ADMIN — Medication 0.2 MG: at 12:25

## 2019-01-01 RX ADMIN — Medication 5 MG: at 00:16

## 2019-01-01 RX ADMIN — LEVETIRACETAM 1500 MG: 500 TABLET, FILM COATED ORAL at 21:14

## 2019-01-01 RX ADMIN — LORAZEPAM 1 MG: 2 INJECTION INTRAMUSCULAR; INTRAVENOUS at 03:01

## 2019-01-01 RX ADMIN — MAGNESIUM SULFATE HEPTAHYDRATE 2 G: 40 INJECTION, SOLUTION INTRAVENOUS at 22:47

## 2019-01-01 RX ADMIN — METOCLOPRAMIDE 2.5 MG: 5 INJECTION, SOLUTION INTRAMUSCULAR; INTRAVENOUS at 00:56

## 2019-01-01 RX ADMIN — HALOPERIDOL LACTATE 2 MG: 5 INJECTION, SOLUTION INTRAMUSCULAR at 08:29

## 2019-01-01 RX ADMIN — DIPHENHYDRAMINE HCL 25 MG: 25 TABLET ORAL at 00:28

## 2019-01-01 RX ADMIN — TORSEMIDE 20 MG: 20 TABLET ORAL at 08:50

## 2019-01-01 RX ADMIN — LORAZEPAM 1 MG: 2 INJECTION INTRAMUSCULAR; INTRAVENOUS at 20:42

## 2019-01-01 RX ADMIN — PANTOPRAZOLE SODIUM 40 MG: 40 INJECTION, POWDER, FOR SOLUTION INTRAVENOUS at 22:56

## 2019-01-01 RX ADMIN — KETOROLAC TROMETHAMINE 15 MG: 30 INJECTION, SOLUTION INTRAMUSCULAR at 03:29

## 2019-01-01 RX ADMIN — ATORVASTATIN CALCIUM 20 MG: 20 TABLET, FILM COATED ORAL at 20:33

## 2019-01-01 RX ADMIN — HALOPERIDOL LACTATE 2 MG: 5 INJECTION, SOLUTION INTRAMUSCULAR at 14:30

## 2019-01-01 RX ADMIN — LORAZEPAM 0.5 MG: 2 INJECTION INTRAMUSCULAR; INTRAVENOUS at 20:28

## 2019-01-01 RX ADMIN — HALOPERIDOL LACTATE 2 MG: 5 INJECTION, SOLUTION INTRAMUSCULAR at 20:42

## 2019-01-01 RX ADMIN — HALOPERIDOL LACTATE 1 MG: 5 INJECTION, SOLUTION INTRAMUSCULAR at 08:19

## 2019-01-01 RX ADMIN — HYDROMORPHONE HYDROCHLORIDE 1 MG: 2 INJECTION, SOLUTION INTRAMUSCULAR; INTRAVENOUS; SUBCUTANEOUS at 22:00

## 2019-01-01 RX ADMIN — LEVETIRACETAM 1500 MG: 500 TABLET, FILM COATED ORAL at 20:33

## 2019-01-01 RX ADMIN — PANTOPRAZOLE SODIUM 40 MG: 40 INJECTION, POWDER, FOR SOLUTION INTRAVENOUS at 08:23

## 2019-01-01 RX ADMIN — ENOXAPARIN SODIUM 40 MG: 40 INJECTION, SOLUTION INTRAVENOUS; SUBCUTANEOUS at 10:11

## 2019-01-01 RX ADMIN — HYDROCODONE BITARTRATE AND ACETAMINOPHEN 1 TABLET: 5; 325 TABLET ORAL at 09:14

## 2019-01-01 RX ADMIN — HALOPERIDOL LACTATE 1 MG: 5 INJECTION, SOLUTION INTRAMUSCULAR at 15:32

## 2019-01-01 RX ADMIN — FAMOTIDINE 10 MG: 20 TABLET ORAL at 09:50

## 2019-01-01 RX ADMIN — KETOROLAC TROMETHAMINE 15 MG: 30 INJECTION, SOLUTION INTRAMUSCULAR at 08:22

## 2019-01-01 RX ADMIN — HALOPERIDOL LACTATE 1 MG: 5 INJECTION, SOLUTION INTRAMUSCULAR at 12:03

## 2019-01-01 RX ADMIN — Medication 5 MG: at 00:10

## 2019-01-01 RX ADMIN — METOPROLOL TARTRATE 2.5 MG: 5 INJECTION INTRAVENOUS at 10:01

## 2019-01-01 RX ADMIN — DEXAMETHASONE SODIUM PHOSPHATE 4 MG: 4 INJECTION, SOLUTION INTRAMUSCULAR; INTRAVENOUS at 11:00

## 2019-01-01 RX ADMIN — ZONISAMIDE 400 MG: 100 CAPSULE ORAL at 22:35

## 2019-01-01 RX ADMIN — METOPROLOL TARTRATE 100 MG: 50 TABLET ORAL at 21:00

## 2019-01-01 RX ADMIN — OYSTER SHELL CALCIUM WITH VITAMIN D 1 TABLET: 500; 200 TABLET, FILM COATED ORAL at 15:16

## 2019-01-01 RX ADMIN — VANCOMYCIN HYDROCHLORIDE 1250 MG: 5 INJECTION, POWDER, LYOPHILIZED, FOR SOLUTION INTRAVENOUS at 08:55

## 2019-01-01 RX ADMIN — SERTRALINE HYDROCHLORIDE 75 MG: 50 TABLET ORAL at 10:12

## 2019-01-01 RX ADMIN — LORAZEPAM 1 MG: 2 INJECTION INTRAMUSCULAR; INTRAVENOUS at 14:52

## 2019-01-01 RX ADMIN — SODIUM CHLORIDE, PRESERVATIVE FREE 10 ML: 5 INJECTION INTRAVENOUS at 09:40

## 2019-01-01 RX ADMIN — HALOPERIDOL LACTATE 1 MG: 5 INJECTION, SOLUTION INTRAMUSCULAR at 16:07

## 2019-01-01 RX ADMIN — BACLOFEN 10 MG: 10 TABLET ORAL at 09:25

## 2019-01-01 RX ADMIN — CILOSTAZOL 100 MG: 100 TABLET ORAL at 20:33

## 2019-01-01 RX ADMIN — CEFTRIAXONE 1 G: 1 INJECTION, POWDER, FOR SOLUTION INTRAMUSCULAR; INTRAVENOUS at 09:37

## 2019-01-01 RX ADMIN — Medication 5 MG: at 18:23

## 2019-01-01 RX ADMIN — CILOSTAZOL 100 MG: 100 TABLET ORAL at 10:12

## 2019-01-01 RX ADMIN — HYDROMORPHONE HYDROCHLORIDE 1 MG: 2 INJECTION, SOLUTION INTRAMUSCULAR; INTRAVENOUS; SUBCUTANEOUS at 19:48

## 2019-01-01 RX ADMIN — FAMOTIDINE 10 MG: 20 TABLET ORAL at 21:00

## 2019-01-01 RX ADMIN — HALOPERIDOL LACTATE 1 MG: 5 INJECTION, SOLUTION INTRAMUSCULAR at 11:49

## 2019-01-01 RX ADMIN — HALOPERIDOL LACTATE 1 MG: 5 INJECTION, SOLUTION INTRAMUSCULAR at 15:43

## 2019-01-01 RX ADMIN — MIDODRINE HYDROCHLORIDE 10 MG: 5 TABLET ORAL at 16:58

## 2019-01-01 RX ADMIN — HYDROCORTISONE SODIUM SUCCINATE 50 MG: 100 INJECTION, POWDER, FOR SOLUTION INTRAMUSCULAR; INTRAVENOUS at 15:47

## 2019-01-01 RX ADMIN — FAMOTIDINE 10 MG: 20 TABLET ORAL at 20:55

## 2019-01-01 RX ADMIN — ACYCLOVIR SODIUM 500 MG: 500 INJECTION, SOLUTION INTRAVENOUS at 10:44

## 2019-01-01 RX ADMIN — ACYCLOVIR SODIUM 500 MG: 500 INJECTION, SOLUTION INTRAVENOUS at 03:07

## 2019-01-01 RX ADMIN — GABAPENTIN 300 MG: 300 CAPSULE ORAL at 20:02

## 2019-01-01 RX ADMIN — Medication 5 MG: at 00:56

## 2019-01-01 RX ADMIN — BUTALBITAL, ACETAMINOPHEN AND CAFFEINE 1 TABLET: 50; 325; 40 TABLET ORAL at 00:47

## 2019-01-01 RX ADMIN — Medication: at 17:39

## 2019-01-01 RX ADMIN — HYDROMORPHONE HYDROCHLORIDE 1 MG: 2 INJECTION, SOLUTION INTRAMUSCULAR; INTRAVENOUS; SUBCUTANEOUS at 22:07

## 2019-01-01 RX ADMIN — METOCLOPRAMIDE 2.5 MG: 5 INJECTION, SOLUTION INTRAMUSCULAR; INTRAVENOUS at 00:16

## 2019-01-01 RX ADMIN — METOPROLOL TARTRATE 2.5 MG: 5 INJECTION INTRAVENOUS at 22:55

## 2019-01-01 RX ADMIN — BACITRACIN: 500 OINTMENT OPHTHALMIC at 23:32

## 2019-01-01 RX ADMIN — OXYBUTYNIN CHLORIDE 5 MG: 5 TABLET ORAL at 14:35

## 2019-01-01 RX ADMIN — CEFTRIAXONE 1 G: 1 INJECTION, POWDER, FOR SOLUTION INTRAMUSCULAR; INTRAVENOUS at 23:29

## 2019-01-01 RX ADMIN — Medication: at 17:14

## 2019-01-01 RX ADMIN — SODIUM CHLORIDE: 9 INJECTION, SOLUTION INTRAVENOUS at 16:44

## 2019-01-01 RX ADMIN — LEVETIRACETAM 1000 MG: 100 INJECTION, SOLUTION INTRAVENOUS at 12:03

## 2019-01-01 RX ADMIN — SODIUM CHLORIDE, PRESERVATIVE FREE 10 ML: 5 INJECTION INTRAVENOUS at 09:15

## 2019-01-01 RX ADMIN — BACITRACIN: 500 OINTMENT OPHTHALMIC at 15:33

## 2019-01-01 RX ADMIN — GABAPENTIN 300 MG: 300 CAPSULE ORAL at 09:51

## 2019-01-01 RX ADMIN — METOPROLOL TARTRATE 100 MG: 50 TABLET ORAL at 20:55

## 2019-01-01 RX ADMIN — Medication 10 ML: at 09:23

## 2019-01-01 RX ADMIN — KETOROLAC TROMETHAMINE 15 MG: 30 INJECTION, SOLUTION INTRAMUSCULAR at 16:07

## 2019-01-01 RX ADMIN — FAMOTIDINE 10 MG: 20 TABLET ORAL at 20:34

## 2019-01-01 RX ADMIN — Medication 5 MG: at 00:08

## 2019-01-01 RX ADMIN — ACYCLOVIR SODIUM 500 MG: 500 INJECTION, SOLUTION INTRAVENOUS at 10:20

## 2019-01-01 RX ADMIN — ATORVASTATIN CALCIUM 20 MG: 20 TABLET, FILM COATED ORAL at 23:30

## 2019-01-01 RX ADMIN — LEVETIRACETAM 1000 MG: 100 INJECTION, SOLUTION INTRAVENOUS at 23:39

## 2019-01-01 RX ADMIN — BACITRACIN: 500 OINTMENT OPHTHALMIC at 04:15

## 2019-01-01 RX ADMIN — SERTRALINE HYDROCHLORIDE 75 MG: 50 TABLET ORAL at 09:08

## 2019-01-01 RX ADMIN — GABAPENTIN 300 MG: 300 CAPSULE ORAL at 21:55

## 2019-01-01 RX ADMIN — DEXAMETHASONE SODIUM PHOSPHATE 4 MG: 4 INJECTION, SOLUTION INTRAMUSCULAR; INTRAVENOUS at 00:00

## 2019-01-01 RX ADMIN — BACITRACIN: 500 OINTMENT OPHTHALMIC at 21:13

## 2019-01-01 RX ADMIN — OYSTER SHELL CALCIUM WITH VITAMIN D 1 TABLET: 500; 200 TABLET, FILM COATED ORAL at 20:32

## 2019-01-01 RX ADMIN — OXYBUTYNIN CHLORIDE 5 MG: 5 TABLET ORAL at 08:49

## 2019-01-01 RX ADMIN — ATORVASTATIN CALCIUM 20 MG: 20 TABLET, FILM COATED ORAL at 23:21

## 2019-01-01 RX ADMIN — OYSTER SHELL CALCIUM WITH VITAMIN D 1 TABLET: 500; 200 TABLET, FILM COATED ORAL at 14:35

## 2019-01-01 RX ADMIN — FAMOTIDINE 10 MG: 20 TABLET ORAL at 20:32

## 2019-01-01 RX ADMIN — DEXAMETHASONE SODIUM PHOSPHATE 4 MG: 4 INJECTION, SOLUTION INTRAMUSCULAR; INTRAVENOUS at 11:48

## 2019-01-01 RX ADMIN — BUTALBITAL, ACETAMINOPHEN AND CAFFEINE 1 TABLET: 50; 325; 40 TABLET ORAL at 09:57

## 2019-01-01 RX ADMIN — SODIUM CHLORIDE: 9 INJECTION, SOLUTION INTRAVENOUS at 06:45

## 2019-01-01 RX ADMIN — Medication 10 ML: at 22:55

## 2019-01-01 RX ADMIN — DEXAMETHASONE SODIUM PHOSPHATE 4 MG: 4 INJECTION, SOLUTION INTRAMUSCULAR; INTRAVENOUS at 10:48

## 2019-01-01 RX ADMIN — HALOPERIDOL LACTATE 1 MG: 5 INJECTION, SOLUTION INTRAMUSCULAR at 08:17

## 2019-01-01 RX ADMIN — BACLOFEN 10 MG: 10 TABLET ORAL at 20:33

## 2019-01-01 RX ADMIN — OYSTER SHELL CALCIUM WITH VITAMIN D 1 TABLET: 500; 200 TABLET, FILM COATED ORAL at 23:05

## 2019-01-01 RX ADMIN — MORPHINE SULFATE 30 MG: 1 INJECTION INTRAVENOUS at 01:17

## 2019-01-01 RX ADMIN — MORPHINE SULFATE 30 MG: 1 INJECTION INTRAVENOUS at 18:45

## 2019-01-01 RX ADMIN — HALOPERIDOL LACTATE 1 MG: 5 INJECTION, SOLUTION INTRAMUSCULAR at 18:22

## 2019-01-01 RX ADMIN — SERTRALINE 75 MG: 25 TABLET, FILM COATED ORAL at 08:46

## 2019-01-01 RX ADMIN — GABAPENTIN 300 MG: 300 CAPSULE ORAL at 13:31

## 2019-01-01 RX ADMIN — DOCUSATE SODIUM 100 MG: 100 CAPSULE, LIQUID FILLED ORAL at 20:33

## 2019-01-01 RX ADMIN — ACETAMINOPHEN 650 MG: 325 TABLET ORAL at 00:31

## 2019-01-01 RX ADMIN — GABAPENTIN 300 MG: 300 CAPSULE ORAL at 21:14

## 2019-01-01 RX ADMIN — FAMOTIDINE 10 MG: 20 TABLET ORAL at 09:39

## 2019-01-01 RX ADMIN — HALOPERIDOL LACTATE 1 MG: 5 INJECTION, SOLUTION INTRAMUSCULAR at 20:58

## 2019-01-01 RX ADMIN — LEVETIRACETAM 1500 MG: 500 TABLET, FILM COATED ORAL at 23:21

## 2019-01-01 RX ADMIN — ONDANSETRON 4 MG: 2 INJECTION INTRAMUSCULAR; INTRAVENOUS at 10:06

## 2019-01-01 RX ADMIN — POLYVINYL ALCOHOL 1 DROP: 14 SOLUTION/ DROPS OPHTHALMIC at 08:55

## 2019-01-01 RX ADMIN — GABAPENTIN 300 MG: 300 CAPSULE ORAL at 09:14

## 2019-01-01 RX ADMIN — MORPHINE SULFATE 2 MG: 4 INJECTION, SOLUTION INTRAMUSCULAR; INTRAVENOUS at 22:32

## 2019-01-01 RX ADMIN — MIDODRINE HYDROCHLORIDE 10 MG: 5 TABLET ORAL at 12:43

## 2019-01-01 RX ADMIN — SODIUM CHLORIDE: 9 INJECTION, SOLUTION INTRAVENOUS at 00:28

## 2019-01-01 RX ADMIN — FAMOTIDINE 10 MG: 20 TABLET ORAL at 23:31

## 2019-01-01 RX ADMIN — OXYBUTYNIN CHLORIDE 5 MG: 5 TABLET ORAL at 20:34

## 2019-01-01 RX ADMIN — IOPAMIDOL 75 ML: 755 INJECTION, SOLUTION INTRAVENOUS at 12:04

## 2019-01-01 RX ADMIN — KETOROLAC TROMETHAMINE 15 MG: 30 INJECTION, SOLUTION INTRAMUSCULAR at 09:21

## 2019-01-01 RX ADMIN — OYSTER SHELL CALCIUM WITH VITAMIN D 1 TABLET: 500; 200 TABLET, FILM COATED ORAL at 10:20

## 2019-01-01 RX ADMIN — LORAZEPAM 1 MG: 2 INJECTION INTRAMUSCULAR; INTRAVENOUS at 08:29

## 2019-01-01 RX ADMIN — BACLOFEN 10 MG: 10 TABLET ORAL at 08:51

## 2019-01-01 RX ADMIN — NORTRIPTYLINE HYDROCHLORIDE 50 MG: 25 CAPSULE ORAL at 23:29

## 2019-01-01 RX ADMIN — ENOXAPARIN SODIUM 40 MG: 40 INJECTION SUBCUTANEOUS at 08:46

## 2019-01-01 RX ADMIN — OXYBUTYNIN CHLORIDE 5 MG: 5 TABLET ORAL at 14:05

## 2019-01-01 RX ADMIN — CILOSTAZOL 100 MG: 100 TABLET ORAL at 21:00

## 2019-01-01 RX ADMIN — LORAZEPAM 0.5 MG: 2 INJECTION INTRAMUSCULAR; INTRAVENOUS at 04:16

## 2019-01-01 RX ADMIN — CILOSTAZOL 100 MG: 100 TABLET ORAL at 20:54

## 2019-01-01 RX ADMIN — ATORVASTATIN CALCIUM 20 MG: 20 TABLET, FILM COATED ORAL at 20:34

## 2019-01-01 RX ADMIN — CILOSTAZOL 100 MG: 100 TABLET ORAL at 09:54

## 2019-01-01 RX ADMIN — ACYCLOVIR SODIUM 500 MG: 500 INJECTION, SOLUTION INTRAVENOUS at 02:05

## 2019-01-01 RX ADMIN — HALOPERIDOL LACTATE 1 MG: 5 INJECTION, SOLUTION INTRAMUSCULAR at 20:27

## 2019-01-01 RX ADMIN — HALOPERIDOL LACTATE 1 MG: 5 INJECTION, SOLUTION INTRAMUSCULAR at 21:56

## 2019-01-01 RX ADMIN — DEXAMETHASONE SODIUM PHOSPHATE 4 MG: 4 INJECTION, SOLUTION INTRAMUSCULAR; INTRAVENOUS at 23:27

## 2019-01-01 RX ADMIN — MORPHINE SULFATE 30 MG: 1 INJECTION INTRAVENOUS at 11:22

## 2019-01-01 RX ADMIN — METOPROLOL TARTRATE 2.5 MG: 5 INJECTION INTRAVENOUS at 14:05

## 2019-01-01 RX ADMIN — OYSTER SHELL CALCIUM WITH VITAMIN D 1 TABLET: 500; 200 TABLET, FILM COATED ORAL at 08:51

## 2019-01-01 RX ADMIN — LEVETIRACETAM 1000 MG: 100 INJECTION, SOLUTION INTRAVENOUS at 11:47

## 2019-01-01 RX ADMIN — KETOROLAC TROMETHAMINE 15 MG: 30 INJECTION, SOLUTION INTRAMUSCULAR at 08:19

## 2019-01-01 RX ADMIN — HYDROCODONE BITARTRATE AND ACETAMINOPHEN 1 TABLET: 5; 325 TABLET ORAL at 20:34

## 2019-01-01 RX ADMIN — HYDROCODONE BITARTRATE AND ACETAMINOPHEN 1 TABLET: 5; 325 TABLET ORAL at 05:26

## 2019-01-01 RX ADMIN — HYDROMORPHONE HYDROCHLORIDE 1 MG: 2 INJECTION, SOLUTION INTRAMUSCULAR; INTRAVENOUS; SUBCUTANEOUS at 19:55

## 2019-01-01 RX ADMIN — OYSTER SHELL CALCIUM WITH VITAMIN D 1 TABLET: 500; 200 TABLET, FILM COATED ORAL at 14:31

## 2019-01-01 RX ADMIN — PANTOPRAZOLE SODIUM 40 MG: 40 INJECTION, POWDER, FOR SOLUTION INTRAVENOUS at 17:15

## 2019-01-01 RX ADMIN — PANTOPRAZOLE SODIUM 40 MG: 40 INJECTION, POWDER, FOR SOLUTION INTRAVENOUS at 11:56

## 2019-01-01 RX ADMIN — ZONISAMIDE 400 MG: 100 CAPSULE ORAL at 20:33

## 2019-01-01 RX ADMIN — BACLOFEN 10 MG: 10 TABLET ORAL at 10:03

## 2019-01-01 RX ADMIN — HYDROCODONE BITARTRATE AND ACETAMINOPHEN 1 TABLET: 5; 325 TABLET ORAL at 18:57

## 2019-01-01 RX ADMIN — HALOPERIDOL LACTATE 1 MG: 5 INJECTION, SOLUTION INTRAMUSCULAR at 17:16

## 2019-01-01 RX ADMIN — METOPROLOL TARTRATE 2.5 MG: 5 INJECTION INTRAVENOUS at 10:29

## 2019-01-01 RX ADMIN — Medication 10 ML: at 10:48

## 2019-01-01 RX ADMIN — METOCLOPRAMIDE 2.5 MG: 5 INJECTION, SOLUTION INTRAMUSCULAR; INTRAVENOUS at 04:55

## 2019-01-01 RX ADMIN — METOCLOPRAMIDE 2.5 MG: 5 INJECTION, SOLUTION INTRAMUSCULAR; INTRAVENOUS at 12:01

## 2019-01-01 RX ADMIN — METOCLOPRAMIDE 2.5 MG: 5 INJECTION, SOLUTION INTRAMUSCULAR; INTRAVENOUS at 05:55

## 2019-01-01 RX ADMIN — ONDANSETRON 4 MG: 2 INJECTION INTRAMUSCULAR; INTRAVENOUS at 12:50

## 2019-01-01 RX ADMIN — PROMETHAZINE HYDROCHLORIDE 25 MG: 25 TABLET ORAL at 22:41

## 2019-01-01 RX ADMIN — KETOROLAC TROMETHAMINE 15 MG: 30 INJECTION, SOLUTION INTRAMUSCULAR at 20:28

## 2019-01-01 RX ADMIN — CILOSTAZOL 100 MG: 100 TABLET ORAL at 08:47

## 2019-01-01 RX ADMIN — HALOPERIDOL LACTATE 2 MG: 5 INJECTION, SOLUTION INTRAMUSCULAR at 20:54

## 2019-01-01 RX ADMIN — Medication 5 MG: at 10:53

## 2019-01-01 RX ADMIN — MORPHINE SULFATE 15 MG: 15 TABLET ORAL at 16:20

## 2019-01-01 RX ADMIN — LEVETIRACETAM 1500 MG: 500 TABLET, FILM COATED ORAL at 09:13

## 2019-01-01 RX ADMIN — ACETAMINOPHEN 650 MG: 325 TABLET ORAL at 23:21

## 2019-01-01 RX ADMIN — ENOXAPARIN SODIUM 40 MG: 40 INJECTION, SOLUTION INTRAVENOUS; SUBCUTANEOUS at 08:56

## 2019-01-01 RX ADMIN — ACYCLOVIR SODIUM 500 MG: 500 INJECTION, SOLUTION INTRAVENOUS at 16:58

## 2019-01-01 RX ADMIN — CILOSTAZOL 100 MG: 100 TABLET ORAL at 09:38

## 2019-01-01 RX ADMIN — Medication 10 ML: at 23:31

## 2019-01-01 RX ADMIN — BACLOFEN 10 MG: 10 TABLET ORAL at 22:36

## 2019-01-01 RX ADMIN — POLYVINYL ALCOHOL 1 DROP: 14 SOLUTION/ DROPS OPHTHALMIC at 23:23

## 2019-01-01 RX ADMIN — SODIUM CHLORIDE: 9 INJECTION, SOLUTION INTRAVENOUS at 22:36

## 2019-01-01 RX ADMIN — LORAZEPAM 0.5 MG: 2 INJECTION INTRAMUSCULAR; INTRAVENOUS at 21:17

## 2019-01-01 RX ADMIN — LEVETIRACETAM 1000 MG: 100 INJECTION, SOLUTION INTRAVENOUS at 12:31

## 2019-01-01 RX ADMIN — BACLOFEN 10 MG: 10 TABLET ORAL at 23:29

## 2019-01-01 RX ADMIN — OYSTER SHELL CALCIUM WITH VITAMIN D 1 TABLET: 500; 200 TABLET, FILM COATED ORAL at 23:28

## 2019-01-01 RX ADMIN — BACITRACIN: 500 OINTMENT OPHTHALMIC at 15:48

## 2019-01-01 RX ADMIN — SODIUM CHLORIDE, PRESERVATIVE FREE 10 ML: 5 INJECTION INTRAVENOUS at 10:21

## 2019-01-01 RX ADMIN — LEVETIRACETAM 1000 MG: 500 TABLET, FILM COATED ORAL at 23:29

## 2019-01-01 RX ADMIN — SODIUM CHLORIDE 1000 ML: 9 INJECTION, SOLUTION INTRAVENOUS at 00:28

## 2019-01-01 RX ADMIN — NORTRIPTYLINE HYDROCHLORIDE 50 MG: 25 CAPSULE ORAL at 20:33

## 2019-01-01 RX ADMIN — BACITRACIN: 500 OINTMENT OPHTHALMIC at 12:31

## 2019-01-01 RX ADMIN — MORPHINE SULFATE 3 MG: 4 INJECTION, SOLUTION INTRAMUSCULAR; INTRAVENOUS at 04:16

## 2019-01-01 RX ADMIN — Medication 10 ML: at 08:23

## 2019-01-01 RX ADMIN — OYSTER SHELL CALCIUM WITH VITAMIN D 1 TABLET: 500; 200 TABLET, FILM COATED ORAL at 21:14

## 2019-01-01 RX ADMIN — Medication 0.4 MG: at 11:22

## 2019-01-01 RX ADMIN — SERTRALINE HYDROCHLORIDE 75 MG: 50 TABLET ORAL at 09:39

## 2019-01-01 RX ADMIN — VITAMIN D, TAB 1000IU (100/BT) 5000 UNITS: 25 TAB at 08:47

## 2019-01-01 RX ADMIN — HALOPERIDOL LACTATE 2 MG: 5 INJECTION, SOLUTION INTRAMUSCULAR at 02:52

## 2019-01-01 RX ADMIN — HYDROCODONE BITARTRATE AND ACETAMINOPHEN 1 TABLET: 5; 325 TABLET ORAL at 23:22

## 2019-01-01 RX ADMIN — MIDODRINE HYDROCHLORIDE 5 MG: 5 TABLET ORAL at 16:46

## 2019-01-01 RX ADMIN — DIPHENHYDRAMINE HYDROCHLORIDE 50 MG: 50 INJECTION, SOLUTION INTRAMUSCULAR; INTRAVENOUS at 11:30

## 2019-01-01 RX ADMIN — Medication 10 ML: at 23:27

## 2019-01-01 RX ADMIN — Medication 5 MG: at 06:02

## 2019-01-01 RX ADMIN — BACITRACIN: 500 OINTMENT OPHTHALMIC at 09:14

## 2019-01-01 RX ADMIN — CILOSTAZOL 100 MG: 100 TABLET ORAL at 23:28

## 2019-01-01 RX ADMIN — OYSTER SHELL CALCIUM WITH VITAMIN D 1 TABLET: 500; 200 TABLET, FILM COATED ORAL at 14:15

## 2019-01-01 RX ADMIN — FAMOTIDINE 10 MG: 20 TABLET ORAL at 09:14

## 2019-01-01 RX ADMIN — KETOROLAC TROMETHAMINE 15 MG: 30 INJECTION, SOLUTION INTRAMUSCULAR at 21:58

## 2019-01-01 RX ADMIN — Medication 5 MG: at 23:49

## 2019-01-01 RX ADMIN — FAMOTIDINE 10 MG: 20 TABLET ORAL at 05:26

## 2019-01-01 RX ADMIN — ENOXAPARIN SODIUM 40 MG: 40 INJECTION SUBCUTANEOUS at 09:13

## 2019-01-01 RX ADMIN — CILOSTAZOL 100 MG: 100 TABLET ORAL at 09:13

## 2019-01-01 RX ADMIN — MORPHINE SULFATE 15 MG: 15 TABLET ORAL at 15:47

## 2019-01-01 RX ADMIN — HYDROCODONE BITARTRATE AND ACETAMINOPHEN 1 TABLET: 5; 325 TABLET ORAL at 09:51

## 2019-01-01 RX ADMIN — MORPHINE SULFATE 30 MG: 1 INJECTION INTRAVENOUS at 08:06

## 2019-01-01 RX ADMIN — ACYCLOVIR SODIUM 500 MG: 500 INJECTION, SOLUTION INTRAVENOUS at 10:15

## 2019-01-01 RX ADMIN — FAMOTIDINE 10 MG: 20 TABLET ORAL at 08:56

## 2019-01-01 RX ADMIN — Medication 10 ML: at 09:07

## 2019-01-01 RX ADMIN — BACLOFEN 10 MG: 10 TABLET ORAL at 23:21

## 2019-01-01 RX ADMIN — LEVETIRACETAM 1000 MG: 100 INJECTION, SOLUTION INTRAVENOUS at 21:17

## 2019-01-01 RX ADMIN — CEFTRIAXONE 1 G: 1 INJECTION, POWDER, FOR SOLUTION INTRAMUSCULAR; INTRAVENOUS at 10:11

## 2019-01-01 RX ADMIN — KETOROLAC TROMETHAMINE 15 MG: 30 INJECTION, SOLUTION INTRAMUSCULAR at 15:42

## 2019-01-01 RX ADMIN — OXYBUTYNIN CHLORIDE 5 MG: 5 TABLET ORAL at 09:07

## 2019-01-01 RX ADMIN — VITAMIN D, TAB 1000IU (100/BT) 5000 UNITS: 25 TAB at 08:50

## 2019-01-01 RX ADMIN — GABAPENTIN 300 MG: 300 CAPSULE ORAL at 23:21

## 2019-01-01 RX ADMIN — OXYBUTYNIN CHLORIDE 5 MG: 5 TABLET ORAL at 09:49

## 2019-01-01 RX ADMIN — MORPHINE SULFATE 4 MG: 4 INJECTION, SOLUTION INTRAMUSCULAR; INTRAVENOUS at 01:49

## 2019-01-01 RX ADMIN — KETOROLAC TROMETHAMINE 15 MG: 30 INJECTION, SOLUTION INTRAMUSCULAR at 08:20

## 2019-01-01 RX ADMIN — BACITRACIN: 500 OINTMENT OPHTHALMIC at 00:47

## 2019-01-01 RX ADMIN — SODIUM CHLORIDE, PRESERVATIVE FREE 10 ML: 5 INJECTION INTRAVENOUS at 23:55

## 2019-01-01 RX ADMIN — SODIUM CHLORIDE, PRESERVATIVE FREE 10 ML: 5 INJECTION INTRAVENOUS at 20:55

## 2019-01-01 RX ADMIN — DEXAMETHASONE SODIUM PHOSPHATE 4 MG: 4 INJECTION, SOLUTION INTRAMUSCULAR; INTRAVENOUS at 11:41

## 2019-01-01 RX ADMIN — SODIUM CHLORIDE: 9 INJECTION, SOLUTION INTRAVENOUS at 23:37

## 2019-01-01 RX ADMIN — NORTRIPTYLINE HYDROCHLORIDE 50 MG: 25 CAPSULE ORAL at 21:14

## 2019-01-01 RX ADMIN — HALOPERIDOL LACTATE 1 MG: 5 INJECTION, SOLUTION INTRAMUSCULAR at 16:25

## 2019-01-01 RX ADMIN — OYSTER SHELL CALCIUM WITH VITAMIN D 1 TABLET: 500; 200 TABLET, FILM COATED ORAL at 20:54

## 2019-01-01 RX ADMIN — GABAPENTIN 300 MG: 300 CAPSULE ORAL at 09:08

## 2019-01-01 RX ADMIN — VANCOMYCIN HYDROCHLORIDE 750 MG: 5 INJECTION, POWDER, LYOPHILIZED, FOR SOLUTION INTRAVENOUS at 20:45

## 2019-01-01 RX ADMIN — PANTOPRAZOLE SODIUM 40 MG: 40 INJECTION, POWDER, FOR SOLUTION INTRAVENOUS at 08:17

## 2019-01-01 RX ADMIN — HYDROMORPHONE HYDROCHLORIDE 0.5 MG: 2 INJECTION, SOLUTION INTRAMUSCULAR; INTRAVENOUS; SUBCUTANEOUS at 01:43

## 2019-01-01 RX ADMIN — HYDROCODONE BITARTRATE AND ACETAMINOPHEN 1 TABLET: 5; 325 TABLET ORAL at 18:06

## 2019-01-01 RX ADMIN — Medication 5 MG: at 11:47

## 2019-01-01 RX ADMIN — LEVETIRACETAM 1000 MG: 100 INJECTION, SOLUTION INTRAVENOUS at 12:59

## 2019-01-01 RX ADMIN — SODIUM CHLORIDE 1000 ML: 9 INJECTION, SOLUTION INTRAVENOUS at 05:05

## 2019-01-01 RX ADMIN — SODIUM CHLORIDE 1000 ML: 9 INJECTION, SOLUTION INTRAVENOUS at 17:48

## 2019-01-01 RX ADMIN — HYDROMORPHONE HYDROCHLORIDE 0.5 MG: 2 INJECTION, SOLUTION INTRAMUSCULAR; INTRAVENOUS; SUBCUTANEOUS at 02:24

## 2019-01-01 RX ADMIN — Medication 10 ML: at 20:28

## 2019-01-01 RX ADMIN — BUTALBITAL, ACETAMINOPHEN AND CAFFEINE 1 TABLET: 50; 325; 40 TABLET ORAL at 00:35

## 2019-01-01 RX ADMIN — LEVETIRACETAM 1000 MG: 100 INJECTION, SOLUTION INTRAVENOUS at 00:00

## 2019-01-01 RX ADMIN — Medication 0.4 MG: at 17:48

## 2019-01-01 RX ADMIN — Medication 10 ML: at 23:05

## 2019-01-01 RX ADMIN — CILOSTAZOL 100 MG: 100 TABLET ORAL at 21:14

## 2019-01-01 RX ADMIN — LEVETIRACETAM 1000 MG: 500 TABLET, FILM COATED ORAL at 10:02

## 2019-01-01 RX ADMIN — OYSTER SHELL CALCIUM WITH VITAMIN D 1 TABLET: 500; 200 TABLET, FILM COATED ORAL at 20:45

## 2019-01-01 RX ADMIN — HYDROCODONE BITARTRATE AND ACETAMINOPHEN 1 TABLET: 5; 325 TABLET ORAL at 15:56

## 2019-01-01 RX ADMIN — HYDROCODONE BITARTRATE AND ACETAMINOPHEN 1 TABLET: 5; 325 TABLET ORAL at 06:02

## 2019-01-01 RX ADMIN — SODIUM CHLORIDE, PRESERVATIVE FREE 10 ML: 5 INJECTION INTRAVENOUS at 21:13

## 2019-01-01 RX ADMIN — METHADONE HYDROCHLORIDE 3 MG: 10 CONCENTRATE ORAL at 05:28

## 2019-01-01 RX ADMIN — Medication 10 ML: at 10:04

## 2019-01-01 RX ADMIN — BACITRACIN: 500 OINTMENT OPHTHALMIC at 09:58

## 2019-01-01 RX ADMIN — DOCUSATE SODIUM 100 MG: 100 CAPSULE, LIQUID FILLED ORAL at 08:49

## 2019-01-01 RX ADMIN — OXYBUTYNIN CHLORIDE 5 MG: 5 TABLET ORAL at 23:29

## 2019-01-01 RX ADMIN — CEFTRIAXONE 1 G: 1 INJECTION, POWDER, FOR SOLUTION INTRAMUSCULAR; INTRAVENOUS at 01:16

## 2019-01-01 RX ADMIN — LEVETIRACETAM 1500 MG: 500 TABLET ORAL at 20:59

## 2019-01-01 RX ADMIN — KETOROLAC TROMETHAMINE 15 MG: 30 INJECTION, SOLUTION INTRAMUSCULAR at 16:26

## 2019-01-01 RX ADMIN — Medication 5 MG: at 05:54

## 2019-01-01 RX ADMIN — GABAPENTIN 300 MG: 300 CAPSULE ORAL at 20:54

## 2019-01-01 RX ADMIN — OYSTER SHELL CALCIUM WITH VITAMIN D 1 TABLET: 500; 200 TABLET, FILM COATED ORAL at 10:13

## 2019-01-01 RX ADMIN — BACLOFEN 10 MG: 10 TABLET ORAL at 20:54

## 2019-01-01 RX ADMIN — CEFTRIAXONE 1 G: 1 INJECTION, POWDER, FOR SOLUTION INTRAMUSCULAR; INTRAVENOUS at 22:54

## 2019-01-01 RX ADMIN — Medication 5 MG: at 12:04

## 2019-01-01 RX ADMIN — MORPHINE SULFATE 3 MG: 4 INJECTION, SOLUTION INTRAMUSCULAR; INTRAVENOUS at 20:40

## 2019-01-01 RX ADMIN — ACETAMINOPHEN 650 MG: 325 TABLET ORAL at 09:07

## 2019-01-01 RX ADMIN — LORAZEPAM 0.5 MG: 2 INJECTION INTRAMUSCULAR; INTRAVENOUS at 16:08

## 2019-01-01 RX ADMIN — ATORVASTATIN CALCIUM 20 MG: 20 TABLET, FILM COATED ORAL at 21:15

## 2019-01-01 RX ADMIN — METOPROLOL TARTRATE 2.5 MG: 5 INJECTION INTRAVENOUS at 13:55

## 2019-01-01 RX ADMIN — HALOPERIDOL LACTATE 2 MG: 5 INJECTION, SOLUTION INTRAMUSCULAR at 03:01

## 2019-01-01 RX ADMIN — CEFTRIAXONE 1 G: 1 INJECTION, POWDER, FOR SOLUTION INTRAMUSCULAR; INTRAVENOUS at 08:56

## 2019-01-01 RX ADMIN — BACITRACIN: 500 OINTMENT OPHTHALMIC at 08:49

## 2019-01-01 RX ADMIN — GABAPENTIN 300 MG: 300 CAPSULE ORAL at 08:19

## 2019-01-01 RX ADMIN — SODIUM CHLORIDE 250 ML: 9 INJECTION, SOLUTION INTRAVENOUS at 23:41

## 2019-01-01 RX ADMIN — LORAZEPAM 0.5 MG: 2 INJECTION, SOLUTION INTRAMUSCULAR; INTRAVENOUS at 19:39

## 2019-01-01 RX ADMIN — Medication 5 MG: at 04:54

## 2019-01-01 RX ADMIN — KETOROLAC TROMETHAMINE 15 MG: 30 INJECTION, SOLUTION INTRAMUSCULAR at 12:03

## 2019-01-01 RX ADMIN — METOPROLOL TARTRATE 100 MG: 50 TABLET ORAL at 09:39

## 2019-01-01 RX ADMIN — OXYBUTYNIN CHLORIDE 5 MG: 5 TABLET ORAL at 23:21

## 2019-01-01 RX ADMIN — VANCOMYCIN HYDROCHLORIDE 1500 MG: 5 INJECTION, POWDER, LYOPHILIZED, FOR SOLUTION INTRAVENOUS at 20:06

## 2019-01-01 RX ADMIN — MORPHINE SULFATE 15 MG: 15 TABLET ORAL at 04:34

## 2019-01-01 RX ADMIN — LEVETIRACETAM 1500 MG: 500 TABLET ORAL at 08:56

## 2019-01-01 RX ADMIN — BACLOFEN 10 MG: 10 TABLET ORAL at 21:00

## 2019-01-01 RX ADMIN — FUROSEMIDE 40 MG: 10 INJECTION, SOLUTION INTRAVENOUS at 12:25

## 2019-01-01 RX ADMIN — LEVETIRACETAM 1000 MG: 100 INJECTION, SOLUTION INTRAVENOUS at 12:25

## 2019-01-01 RX ADMIN — OYSTER SHELL CALCIUM WITH VITAMIN D 1 TABLET: 500; 200 TABLET, FILM COATED ORAL at 09:39

## 2019-01-01 RX ADMIN — ZONISAMIDE 400 MG: 100 CAPSULE ORAL at 20:56

## 2019-01-01 RX ADMIN — Medication 10 ML: at 02:49

## 2019-01-01 RX ADMIN — GABAPENTIN 300 MG: 300 CAPSULE ORAL at 10:03

## 2019-01-01 RX ADMIN — METOCLOPRAMIDE 2.5 MG: 5 INJECTION, SOLUTION INTRAMUSCULAR; INTRAVENOUS at 11:41

## 2019-01-01 RX ADMIN — KETOROLAC TROMETHAMINE 15 MG: 30 INJECTION, SOLUTION INTRAMUSCULAR at 04:55

## 2019-01-01 RX ADMIN — METOCLOPRAMIDE 2.5 MG: 5 INJECTION, SOLUTION INTRAMUSCULAR; INTRAVENOUS at 10:54

## 2019-01-01 RX ADMIN — METOCLOPRAMIDE 2.5 MG: 5 INJECTION, SOLUTION INTRAMUSCULAR; INTRAVENOUS at 05:28

## 2019-01-01 RX ADMIN — LEVETIRACETAM 1000 MG: 100 INJECTION, SOLUTION INTRAVENOUS at 14:27

## 2019-01-01 RX ADMIN — CILOSTAZOL 100 MG: 100 TABLET ORAL at 23:04

## 2019-01-01 RX ADMIN — CILOSTAZOL 100 MG: 100 TABLET ORAL at 08:51

## 2019-01-01 RX ADMIN — SODIUM CHLORIDE: 9 INJECTION, SOLUTION INTRAVENOUS at 05:27

## 2019-01-01 RX ADMIN — SERTRALINE 75 MG: 25 TABLET, FILM COATED ORAL at 08:49

## 2019-01-01 RX ADMIN — ENOXAPARIN SODIUM 40 MG: 40 INJECTION, SOLUTION INTRAVENOUS; SUBCUTANEOUS at 09:40

## 2019-01-01 RX ADMIN — Medication 10 ML: at 19:40

## 2019-01-01 RX ADMIN — MORPHINE SULFATE 15 MG: 15 TABLET ORAL at 22:41

## 2019-01-01 RX ADMIN — METOPROLOL TARTRATE 2.5 MG: 5 INJECTION INTRAVENOUS at 09:08

## 2019-01-01 RX ADMIN — VANCOMYCIN HYDROCHLORIDE 1250 MG: 5 INJECTION, POWDER, LYOPHILIZED, FOR SOLUTION INTRAVENOUS at 21:29

## 2019-01-01 RX ADMIN — METOPROLOL TARTRATE 2.5 MG: 5 INJECTION INTRAVENOUS at 04:55

## 2019-01-01 RX ADMIN — Medication 10 ML: at 21:56

## 2019-01-01 RX ADMIN — METOCLOPRAMIDE 2.5 MG: 5 INJECTION, SOLUTION INTRAMUSCULAR; INTRAVENOUS at 01:13

## 2019-01-01 RX ADMIN — DEXAMETHASONE SODIUM PHOSPHATE 4 MG: 4 INJECTION, SOLUTION INTRAMUSCULAR; INTRAVENOUS at 13:37

## 2019-01-01 RX ADMIN — MORPHINE SULFATE 15 MG: 15 TABLET ORAL at 22:06

## 2019-01-01 RX ADMIN — METOCLOPRAMIDE 2.5 MG: 5 INJECTION, SOLUTION INTRAMUSCULAR; INTRAVENOUS at 19:46

## 2019-01-01 RX ADMIN — HALOPERIDOL LACTATE 1 MG: 5 INJECTION, SOLUTION INTRAMUSCULAR at 08:20

## 2019-01-01 RX ADMIN — MORPHINE SULFATE 15 MG: 15 TABLET ORAL at 03:07

## 2019-01-01 RX ADMIN — HALOPERIDOL LACTATE 2 MG: 5 INJECTION, SOLUTION INTRAMUSCULAR at 08:22

## 2019-01-01 RX ADMIN — OYSTER SHELL CALCIUM WITH VITAMIN D 1 TABLET: 500; 200 TABLET, FILM COATED ORAL at 09:08

## 2019-01-01 RX ADMIN — LEVETIRACETAM 1000 MG: 100 INJECTION, SOLUTION INTRAVENOUS at 00:57

## 2019-01-01 RX ADMIN — METOPROLOL TARTRATE 100 MG: 50 TABLET ORAL at 23:21

## 2019-01-01 RX ADMIN — DEXAMETHASONE SODIUM PHOSPHATE 4 MG: 4 INJECTION, SOLUTION INTRAMUSCULAR; INTRAVENOUS at 00:16

## 2019-01-01 RX ADMIN — GABAPENTIN 300 MG: 300 CAPSULE ORAL at 10:12

## 2019-01-01 RX ADMIN — ZONISAMIDE 400 MG: 100 CAPSULE ORAL at 23:23

## 2019-01-01 RX ADMIN — Medication 10 ML: at 20:54

## 2019-01-01 RX ADMIN — LORAZEPAM 1 MG: 2 INJECTION INTRAMUSCULAR; INTRAVENOUS at 02:51

## 2019-01-01 RX ADMIN — KETOROLAC TROMETHAMINE 15 MG: 30 INJECTION, SOLUTION INTRAMUSCULAR at 20:56

## 2019-01-01 RX ADMIN — LEVETIRACETAM 1000 MG: 500 TABLET, FILM COATED ORAL at 22:36

## 2019-01-01 RX ADMIN — DEXAMETHASONE SODIUM PHOSPHATE 4 MG: 4 INJECTION, SOLUTION INTRAMUSCULAR; INTRAVENOUS at 11:53

## 2019-01-01 RX ADMIN — OXYBUTYNIN CHLORIDE 5 MG: 5 TABLET ORAL at 08:51

## 2019-01-01 RX ADMIN — HYDROCODONE BITARTRATE AND ACETAMINOPHEN 1 TABLET: 5; 325 TABLET ORAL at 20:33

## 2019-01-01 RX ADMIN — DOCUSATE SODIUM 100 MG: 100 CAPSULE, LIQUID FILLED ORAL at 21:15

## 2019-01-01 RX ADMIN — POLYVINYL ALCOHOL 1 DROP: 14 SOLUTION/ DROPS OPHTHALMIC at 09:14

## 2019-01-01 RX ADMIN — ACYCLOVIR SODIUM 500 MG: 500 INJECTION, SOLUTION INTRAVENOUS at 01:24

## 2019-01-01 RX ADMIN — OXYBUTYNIN CHLORIDE 5 MG: 5 TABLET ORAL at 21:14

## 2019-01-01 RX ADMIN — BACLOFEN 10 MG: 10 TABLET ORAL at 10:12

## 2019-01-01 RX ADMIN — KETOROLAC TROMETHAMINE 15 MG: 30 INJECTION, SOLUTION INTRAMUSCULAR at 21:18

## 2019-01-01 RX ADMIN — LORAZEPAM 0.5 MG: 2 INJECTION, SOLUTION INTRAMUSCULAR; INTRAVENOUS at 08:18

## 2019-01-01 RX ADMIN — METOPROLOL TARTRATE 2.5 MG: 5 INJECTION INTRAVENOUS at 16:46

## 2019-01-01 RX ADMIN — ACYCLOVIR SODIUM 500 MG: 500 INJECTION, SOLUTION INTRAVENOUS at 17:27

## 2019-01-01 RX ADMIN — BACLOFEN 10 MG: 10 TABLET ORAL at 20:34

## 2019-01-01 RX ADMIN — KETOROLAC TROMETHAMINE 15 MG: 30 INJECTION, SOLUTION INTRAMUSCULAR at 15:32

## 2019-01-01 RX ADMIN — Medication 10 ML: at 21:18

## 2019-01-01 RX ADMIN — VITAMIN D, TAB 1000IU (100/BT) 5000 UNITS: 25 TAB at 09:13

## 2019-01-01 RX ADMIN — ZONISAMIDE 400 MG: 100 CAPSULE ORAL at 21:14

## 2019-01-01 RX ADMIN — OYSTER SHELL CALCIUM WITH VITAMIN D 1 TABLET: 500; 200 TABLET, FILM COATED ORAL at 08:56

## 2019-01-01 RX ADMIN — HYDROCODONE BITARTRATE AND ACETAMINOPHEN 1 TABLET: 5; 325 TABLET ORAL at 04:50

## 2019-01-01 RX ADMIN — Medication 0.2 MG: at 08:41

## 2019-01-01 RX ADMIN — HALOPERIDOL LACTATE 2 MG: 5 INJECTION, SOLUTION INTRAMUSCULAR at 14:14

## 2019-01-01 RX ADMIN — KETOROLAC TROMETHAMINE 15 MG: 30 INJECTION, SOLUTION INTRAMUSCULAR at 03:06

## 2019-01-01 RX ADMIN — HALOPERIDOL LACTATE 1 MG: 5 INJECTION, SOLUTION INTRAMUSCULAR at 13:57

## 2019-01-01 RX ADMIN — PANTOPRAZOLE SODIUM 40 MG: 40 INJECTION, POWDER, FOR SOLUTION INTRAVENOUS at 09:56

## 2019-01-01 RX ADMIN — SODIUM CHLORIDE 250 ML: 9 INJECTION, SOLUTION INTRAVENOUS at 15:31

## 2019-01-01 RX ADMIN — LEVETIRACETAM 1000 MG: 100 INJECTION, SOLUTION INTRAVENOUS at 10:49

## 2019-01-01 RX ADMIN — VANCOMYCIN HYDROCHLORIDE 750 MG: 5 INJECTION, POWDER, LYOPHILIZED, FOR SOLUTION INTRAVENOUS at 08:51

## 2019-01-01 RX ADMIN — DEXAMETHASONE SODIUM PHOSPHATE 4 MG: 4 INJECTION, SOLUTION INTRAMUSCULAR; INTRAVENOUS at 12:59

## 2019-01-01 RX ADMIN — OYSTER SHELL CALCIUM WITH VITAMIN D 1 TABLET: 500; 200 TABLET, FILM COATED ORAL at 23:30

## 2019-01-01 RX ADMIN — DEXAMETHASONE SODIUM PHOSPHATE 4 MG: 4 INJECTION, SOLUTION INTRAMUSCULAR; INTRAVENOUS at 01:13

## 2019-01-01 RX ADMIN — DOCUSATE SODIUM 100 MG: 100 CAPSULE, LIQUID FILLED ORAL at 09:54

## 2019-01-01 RX ADMIN — GABAPENTIN 300 MG: 300 CAPSULE ORAL at 08:23

## 2019-01-01 RX ADMIN — HALOPERIDOL LACTATE 2 MG: 5 INJECTION, SOLUTION INTRAMUSCULAR at 02:48

## 2019-01-01 RX ADMIN — OXYBUTYNIN CHLORIDE 5 MG: 5 TABLET ORAL at 15:33

## 2019-01-01 RX ADMIN — HALOPERIDOL LACTATE 1 MG: 5 INJECTION, SOLUTION INTRAMUSCULAR at 08:23

## 2019-01-01 RX ADMIN — DEXAMETHASONE SODIUM PHOSPHATE 4 MG: 4 INJECTION, SOLUTION INTRAMUSCULAR; INTRAVENOUS at 12:25

## 2019-01-01 RX ADMIN — LORAZEPAM 1 MG: 2 INJECTION INTRAMUSCULAR; INTRAVENOUS at 14:30

## 2019-01-01 RX ADMIN — KETOROLAC TROMETHAMINE 15 MG: 30 INJECTION, SOLUTION INTRAMUSCULAR at 15:07

## 2019-01-01 RX ADMIN — HALOPERIDOL LACTATE 2 MG: 5 INJECTION, SOLUTION INTRAMUSCULAR at 20:48

## 2019-01-01 RX ADMIN — GABAPENTIN 300 MG: 300 CAPSULE ORAL at 09:19

## 2019-01-01 RX ADMIN — LORAZEPAM 1 MG: 2 INJECTION INTRAMUSCULAR; INTRAVENOUS at 15:22

## 2019-01-01 RX ADMIN — MORPHINE SULFATE 15 MG: 15 TABLET ORAL at 11:16

## 2019-01-01 RX ADMIN — POLYVINYL ALCOHOL 1 DROP: 14 SOLUTION/ DROPS OPHTHALMIC at 09:59

## 2019-01-01 RX ADMIN — ACYCLOVIR SODIUM 500 MG: 500 INJECTION, SOLUTION INTRAVENOUS at 20:34

## 2019-01-01 RX ADMIN — KETOROLAC TROMETHAMINE 15 MG: 30 INJECTION, SOLUTION INTRAMUSCULAR at 05:57

## 2019-01-01 RX ADMIN — FAMOTIDINE 10 MG: 20 TABLET ORAL at 08:51

## 2019-01-01 RX ADMIN — POLYVINYL ALCOHOL 1 DROP: 14 SOLUTION/ DROPS OPHTHALMIC at 08:50

## 2019-01-01 RX ADMIN — METOPROLOL TARTRATE 2.5 MG: 5 INJECTION INTRAVENOUS at 23:30

## 2019-01-01 RX ADMIN — METOCLOPRAMIDE 2.5 MG: 5 INJECTION, SOLUTION INTRAMUSCULAR; INTRAVENOUS at 05:52

## 2019-01-01 RX ADMIN — METOCLOPRAMIDE 2.5 MG: 5 INJECTION, SOLUTION INTRAMUSCULAR; INTRAVENOUS at 18:20

## 2019-01-01 RX ADMIN — Medication 5 MG: at 00:28

## 2019-01-01 RX ADMIN — DOCUSATE SODIUM 100 MG: 100 CAPSULE, LIQUID FILLED ORAL at 09:15

## 2019-01-01 RX ADMIN — BACLOFEN 10 MG: 10 TABLET ORAL at 21:15

## 2019-01-01 RX ADMIN — METOPROLOL TARTRATE 100 MG: 50 TABLET ORAL at 08:56

## 2019-01-01 RX ADMIN — DEXAMETHASONE SODIUM PHOSPHATE 4 MG: 4 INJECTION, SOLUTION INTRAMUSCULAR; INTRAVENOUS at 23:39

## 2019-01-01 RX ADMIN — DEXAMETHASONE SODIUM PHOSPHATE 4 MG: 4 INJECTION, SOLUTION INTRAMUSCULAR; INTRAVENOUS at 10:35

## 2019-01-01 RX ADMIN — HYDROMORPHONE HYDROCHLORIDE 1 MG: 2 INJECTION, SOLUTION INTRAMUSCULAR; INTRAVENOUS; SUBCUTANEOUS at 08:41

## 2019-01-01 RX ADMIN — TETRACAINE HYDROCHLORIDE 2 DROP: 25 LIQUID OPHTHALMIC at 17:48

## 2019-01-01 RX ADMIN — HYDROMORPHONE HYDROCHLORIDE 0.5 MG: 2 INJECTION, SOLUTION INTRAMUSCULAR; INTRAVENOUS; SUBCUTANEOUS at 14:27

## 2019-01-01 RX ADMIN — BACLOFEN 10 MG: 10 TABLET ORAL at 08:56

## 2019-01-01 RX ADMIN — LEVETIRACETAM 1500 MG: 500 TABLET ORAL at 20:54

## 2019-01-01 RX ADMIN — Medication 0.5 MG: at 18:33

## 2019-01-01 RX ADMIN — KETOROLAC TROMETHAMINE 30 MG: 30 INJECTION, SOLUTION INTRAMUSCULAR; INTRAVENOUS at 21:51

## 2019-01-01 RX ADMIN — LEVETIRACETAM 1500 MG: 500 TABLET, FILM COATED ORAL at 09:52

## 2019-01-01 RX ADMIN — OYSTER SHELL CALCIUM WITH VITAMIN D 1 TABLET: 500; 200 TABLET, FILM COATED ORAL at 10:03

## 2019-01-01 RX ADMIN — OYSTER SHELL CALCIUM WITH VITAMIN D 1 TABLET: 500; 200 TABLET, FILM COATED ORAL at 17:37

## 2019-01-01 RX ADMIN — KETOROLAC TROMETHAMINE 15 MG: 30 INJECTION, SOLUTION INTRAMUSCULAR; INTRAVENOUS at 22:08

## 2019-01-01 RX ADMIN — Medication 5 MG: at 19:46

## 2019-01-01 RX ADMIN — FAMOTIDINE 10 MG: 20 TABLET ORAL at 08:47

## 2019-01-01 RX ADMIN — GABAPENTIN 300 MG: 300 CAPSULE ORAL at 20:34

## 2019-01-01 RX ADMIN — DEXAMETHASONE SODIUM PHOSPHATE 4 MG: 4 INJECTION, SOLUTION INTRAMUSCULAR; INTRAVENOUS at 23:40

## 2019-01-01 RX ADMIN — LORAZEPAM 0.5 MG: 2 INJECTION INTRAMUSCULAR; INTRAVENOUS at 04:56

## 2019-01-01 RX ADMIN — PROMETHAZINE HYDROCHLORIDE 25 MG: 25 TABLET ORAL at 23:56

## 2019-01-01 RX ADMIN — Medication 10 ML: at 20:43

## 2019-01-01 RX ADMIN — Medication 10 ML: at 19:55

## 2019-01-01 RX ADMIN — HYDROCODONE BITARTRATE AND ACETAMINOPHEN 1 TABLET: 5; 325 TABLET ORAL at 08:51

## 2019-01-01 RX ADMIN — Medication 5 MG: at 17:13

## 2019-01-01 RX ADMIN — METOPROLOL TARTRATE 100 MG: 50 TABLET ORAL at 10:12

## 2019-01-01 RX ADMIN — ZONISAMIDE 400 MG: 100 CAPSULE ORAL at 21:55

## 2019-01-01 RX ADMIN — CILOSTAZOL 100 MG: 100 TABLET ORAL at 08:56

## 2019-01-01 RX ADMIN — OYSTER SHELL CALCIUM WITH VITAMIN D 1 TABLET: 500; 200 TABLET, FILM COATED ORAL at 14:05

## 2019-01-01 RX ADMIN — LORAZEPAM 1 MG: 2 INJECTION INTRAMUSCULAR; INTRAVENOUS at 20:54

## 2019-01-01 RX ADMIN — LEVETIRACETAM 1000 MG: 100 INJECTION, SOLUTION INTRAVENOUS at 23:45

## 2019-01-01 RX ADMIN — PANTOPRAZOLE SODIUM 40 MG: 40 INJECTION, POWDER, FOR SOLUTION INTRAVENOUS at 09:08

## 2019-01-01 RX ADMIN — METOCLOPRAMIDE 2.5 MG: 5 INJECTION, SOLUTION INTRAMUSCULAR; INTRAVENOUS at 23:45

## 2019-01-01 RX ADMIN — SODIUM CHLORIDE: 9 INJECTION, SOLUTION INTRAVENOUS at 17:54

## 2019-01-01 RX ADMIN — LEVETIRACETAM 1000 MG: 100 INJECTION, SOLUTION INTRAVENOUS at 23:27

## 2019-01-01 RX ADMIN — KETOROLAC TROMETHAMINE 15 MG: 30 INJECTION, SOLUTION INTRAMUSCULAR at 21:55

## 2019-01-01 RX ADMIN — DEXAMETHASONE SODIUM PHOSPHATE 4 MG: 4 INJECTION, SOLUTION INTRAMUSCULAR; INTRAVENOUS at 00:04

## 2019-01-01 RX ADMIN — SODIUM CHLORIDE, PRESERVATIVE FREE 10 ML: 5 INJECTION INTRAVENOUS at 21:01

## 2019-01-01 RX ADMIN — Medication 5 MG: at 13:09

## 2019-01-01 RX ADMIN — HALOPERIDOL LACTATE 2 MG: 5 INJECTION, SOLUTION INTRAMUSCULAR at 10:48

## 2019-01-01 RX ADMIN — HYDROCODONE BITARTRATE AND ACETAMINOPHEN 1 TABLET: 5; 325 TABLET ORAL at 08:49

## 2019-01-01 RX ADMIN — OXYBUTYNIN CHLORIDE 5 MG: 5 TABLET ORAL at 14:15

## 2019-01-01 ASSESSMENT — PAIN DESCRIPTION - DESCRIPTORS
DESCRIPTORS: ACHING
DESCRIPTORS: ACHING
DESCRIPTORS: SHARP;HEADACHE
DESCRIPTORS: HEADACHE
DESCRIPTORS: ACHING
DESCRIPTORS: ACHING
DESCRIPTORS: HEADACHE;SHARP
DESCRIPTORS: SHARP;HEADACHE
DESCRIPTORS: HEADACHE;SHARP
DESCRIPTORS: ACHING

## 2019-01-01 ASSESSMENT — PAIN SCALES - GENERAL
PAINLEVEL_OUTOF10: 9
PAINLEVEL_OUTOF10: 7
PAINLEVEL_OUTOF10: 8
PAINLEVEL_OUTOF10: 0
PAINLEVEL_OUTOF10: 4
PAINLEVEL_OUTOF10: 0
PAINLEVEL_OUTOF10: 10
PAINLEVEL_OUTOF10: 0
PAINLEVEL_OUTOF10: 7
PAINLEVEL_OUTOF10: 9
PAINLEVEL_OUTOF10: 0
PAINLEVEL_OUTOF10: 10
PAINLEVEL_OUTOF10: 8
PAINLEVEL_OUTOF10: 0
PAINLEVEL_OUTOF10: 3
PAINLEVEL_OUTOF10: 8
PAINLEVEL_OUTOF10: 10
PAINLEVEL_OUTOF10: 0
PAINLEVEL_OUTOF10: 9
PAINLEVEL_OUTOF10: 10
PAINLEVEL_OUTOF10: 10
PAINLEVEL_OUTOF10: 9
PAINLEVEL_OUTOF10: 10
PAINLEVEL_OUTOF10: 9
PAINLEVEL_OUTOF10: 9
PAINLEVEL_OUTOF10: 10
PAINLEVEL_OUTOF10: 0
PAINLEVEL_OUTOF10: 10
PAINLEVEL_OUTOF10: 10
PAINLEVEL_OUTOF10: 8
PAINLEVEL_OUTOF10: 6
PAINLEVEL_OUTOF10: 0
PAINLEVEL_OUTOF10: 8
PAINLEVEL_OUTOF10: 9
PAINLEVEL_OUTOF10: 9
PAINLEVEL_OUTOF10: 7
PAINLEVEL_OUTOF10: 0
PAINLEVEL_OUTOF10: 9
PAINLEVEL_OUTOF10: 6
PAINLEVEL_OUTOF10: 6
PAINLEVEL_OUTOF10: 10
PAINLEVEL_OUTOF10: 8
PAINLEVEL_OUTOF10: 9
PAINLEVEL_OUTOF10: 9
PAINLEVEL_OUTOF10: 10
PAINLEVEL_OUTOF10: 0
PAINLEVEL_OUTOF10: 5
PAINLEVEL_OUTOF10: 1
PAINLEVEL_OUTOF10: 10
PAINLEVEL_OUTOF10: 0
PAINLEVEL_OUTOF10: 9
PAINLEVEL_OUTOF10: 3
PAINLEVEL_OUTOF10: 8
PAINLEVEL_OUTOF10: 8
PAINLEVEL_OUTOF10: 9
PAINLEVEL_OUTOF10: 1
PAINLEVEL_OUTOF10: 10
PAINLEVEL_OUTOF10: 8
PAINLEVEL_OUTOF10: 8
PAINLEVEL_OUTOF10: 0
PAINLEVEL_OUTOF10: 4
PAINLEVEL_OUTOF10: 9
PAINLEVEL_OUTOF10: 9
PAINLEVEL_OUTOF10: 0
PAINLEVEL_OUTOF10: 3
PAINLEVEL_OUTOF10: 8
PAINLEVEL_OUTOF10: 5
PAINLEVEL_OUTOF10: 0
PAINLEVEL_OUTOF10: 10
PAINLEVEL_OUTOF10: 6
PAINLEVEL_OUTOF10: 10
PAINLEVEL_OUTOF10: 6
PAINLEVEL_OUTOF10: 8
PAINLEVEL_OUTOF10: 10
PAINLEVEL_OUTOF10: 8
PAINLEVEL_OUTOF10: 9
PAINLEVEL_OUTOF10: 9
PAINLEVEL_OUTOF10: 7
PAINLEVEL_OUTOF10: 8
PAINLEVEL_OUTOF10: 8
PAINLEVEL_OUTOF10: 0
PAINLEVEL_OUTOF10: 6
PAINLEVEL_OUTOF10: 10
PAINLEVEL_OUTOF10: 10
PAINLEVEL_OUTOF10: 4
PAINLEVEL_OUTOF10: 9
PAINLEVEL_OUTOF10: 9
PAINLEVEL_OUTOF10: 10
PAINLEVEL_OUTOF10: 9
PAINLEVEL_OUTOF10: 9
PAINLEVEL_OUTOF10: 0
PAINLEVEL_OUTOF10: 7
PAINLEVEL_OUTOF10: 10
PAINLEVEL_OUTOF10: 0
PAINLEVEL_OUTOF10: 9
PAINLEVEL_OUTOF10: 7
PAINLEVEL_OUTOF10: 10
PAINLEVEL_OUTOF10: 10
PAINLEVEL_OUTOF10: 5
PAINLEVEL_OUTOF10: 6
PAINLEVEL_OUTOF10: 10
PAINLEVEL_OUTOF10: 10
PAINLEVEL_OUTOF10: 0
PAINLEVEL_OUTOF10: 0
PAINLEVEL_OUTOF10: 5
PAINLEVEL_OUTOF10: 0
PAINLEVEL_OUTOF10: 7
PAINLEVEL_OUTOF10: 10
PAINLEVEL_OUTOF10: 6
PAINLEVEL_OUTOF10: 0

## 2019-01-01 ASSESSMENT — PAIN SCALES - WONG BAKER
WONGBAKER_NUMERICALRESPONSE: 0
WONGBAKER_NUMERICALRESPONSE: 6
WONGBAKER_NUMERICALRESPONSE: 0

## 2019-01-01 ASSESSMENT — PAIN - FUNCTIONAL ASSESSMENT
PAIN_FUNCTIONAL_ASSESSMENT: INTOLERABLE, UNABLE TO DO ANY ACTIVE OR PASSIVE ACTIVITIES
PAIN_FUNCTIONAL_ASSESSMENT: PREVENTS OR INTERFERES SOME ACTIVE ACTIVITIES AND ADLS
PAIN_FUNCTIONAL_ASSESSMENT: PREVENTS OR INTERFERES SOME ACTIVE ACTIVITIES AND ADLS
PAIN_FUNCTIONAL_ASSESSMENT: PREVENTS OR INTERFERES WITH ALL ACTIVE AND SOME PASSIVE ACTIVITIES
PAIN_FUNCTIONAL_ASSESSMENT: INTOLERABLE, UNABLE TO DO ANY ACTIVE OR PASSIVE ACTIVITIES
PAIN_FUNCTIONAL_ASSESSMENT: PREVENTS OR INTERFERES SOME ACTIVE ACTIVITIES AND ADLS
PAIN_FUNCTIONAL_ASSESSMENT: PREVENTS OR INTERFERES SOME ACTIVE ACTIVITIES AND ADLS
PAIN_FUNCTIONAL_ASSESSMENT: PREVENTS OR INTERFERES WITH ALL ACTIVE AND SOME PASSIVE ACTIVITIES

## 2019-01-01 ASSESSMENT — PAIN DESCRIPTION - ORIENTATION
ORIENTATION: LEFT;RIGHT
ORIENTATION: LEFT;RIGHT
ORIENTATION: RIGHT;LEFT
ORIENTATION: LEFT;RIGHT
ORIENTATION: LEFT

## 2019-01-01 ASSESSMENT — PAIN DESCRIPTION - ONSET
ONSET: PROGRESSIVE
ONSET: ON-GOING
ONSET: ON-GOING
ONSET: PROGRESSIVE
ONSET: ON-GOING

## 2019-01-01 ASSESSMENT — ENCOUNTER SYMPTOMS: SHORTNESS OF BREATH: 1

## 2019-01-01 ASSESSMENT — PAIN DESCRIPTION - LOCATION
LOCATION: GENERALIZED
LOCATION: HEAD
LOCATION: LEG
LOCATION: GENERALIZED
LOCATION: LEG
LOCATION: GENERALIZED
LOCATION: LEG
LOCATION: GENERALIZED
LOCATION: GENERALIZED
LOCATION: BACK;NECK
LOCATION: FACE;HEAD;LEG
LOCATION: FACE;HEAD;LEG
LOCATION: LEG
LOCATION: GENERALIZED
LOCATION: EYE
LOCATION: HEAD;FOOT

## 2019-01-01 ASSESSMENT — PAIN DESCRIPTION - PAIN TYPE
TYPE: CHRONIC PAIN
TYPE: CHRONIC PAIN
TYPE: ACUTE PAIN
TYPE: ACUTE PAIN;CHRONIC PAIN
TYPE: ACUTE PAIN
TYPE: CHRONIC PAIN
TYPE: CHRONIC PAIN
TYPE: CHRONIC PAIN;ACUTE PAIN
TYPE: ACUTE PAIN
TYPE: ACUTE PAIN
TYPE: ACUTE PAIN;CHRONIC PAIN
TYPE: ACUTE PAIN
TYPE: CHRONIC PAIN;ACUTE PAIN
TYPE: ACUTE PAIN
TYPE: CHRONIC PAIN

## 2019-01-01 ASSESSMENT — PAIN DESCRIPTION - FREQUENCY
FREQUENCY: CONTINUOUS
FREQUENCY: INTERMITTENT
FREQUENCY: CONTINUOUS
FREQUENCY: CONTINUOUS
FREQUENCY: INTERMITTENT
FREQUENCY: INTERMITTENT
FREQUENCY: CONTINUOUS
FREQUENCY: CONTINUOUS
FREQUENCY: INTERMITTENT
FREQUENCY: INTERMITTENT
FREQUENCY: CONTINUOUS

## 2019-01-01 ASSESSMENT — PAIN DESCRIPTION - PROGRESSION
CLINICAL_PROGRESSION: NOT CHANGED
CLINICAL_PROGRESSION: GRADUALLY WORSENING

## 2019-03-26 NOTE — ED PROVIDER NOTES
on file     Inability: Not on file    Transportation needs:     Medical: Not on file     Non-medical: Not on file   Tobacco Use    Smoking status: Never Smoker    Smokeless tobacco: Never Used   Substance and Sexual Activity    Alcohol use: No    Drug use: No    Sexual activity: Not Currently   Lifestyle    Physical activity:     Days per week: Not on file     Minutes per session: Not on file    Stress: Not on file   Relationships    Social connections:     Talks on phone: Not on file     Gets together: Not on file     Attends Nondenominational service: Not on file     Active member of club or organization: Not on file     Attends meetings of clubs or organizations: Not on file     Relationship status: Not on file    Intimate partner violence:     Fear of current or ex partner: Not on file     Emotionally abused: Not on file     Physically abused: Not on file     Forced sexual activity: Not on file   Other Topics Concern    Not on file   Social History Narrative    Not on file     No current facility-administered medications for this encounter. Current Outpatient Medications   Medication Sig Dispense Refill    torsemide (DEMADEX) 20 MG tablet Take 1 tablet by mouth every other day 30 tablet 1    gabapentin (NEURONTIN) 300 MG capsule Take 300 mg by mouth 2 times daily      acetaminophen (TYLENOL) 325 MG tablet Take 650 mg by mouth every 4 hours as needed for Pain      loperamide (IMODIUM) 2 MG capsule Take 2 mg by mouth as needed for Diarrhea      cholestyramine (QUESTRAN) 4 G packet Take 1 packet by mouth every 12 hours as needed (diarrhea)      HYDROcodone-acetaminophen (NORCO) 5-325 MG per tablet Take 1 tablet by mouth 2 times daily as needed for Pain . (Patient taking differently: Take 1 tablet by mouth every 12 hours  . ) 10 tablet 0    butalbital-acetaminophen-caffeine (FIORICET, ESGIC) -40 MG per tablet Take 1 tablet by mouth every 6 hours as needed for Headaches or Migraine 5 tablet 0    metFORMIN (GLUCOPHAGE) 500 MG tablet Take 1 tablet by mouth daily (with breakfast) 60 tablet 3    levETIRAcetam (KEPPRA) 1000 MG tablet Take 1 tablet by mouth 2 times daily (Patient taking differently: Take 1,500 mg by mouth 2 times daily ) 60 tablet 3    metoprolol (LOPRESSOR) 100 MG tablet Take 1 tablet by mouth 2 times daily 60 tablet 3    atorvastatin (LIPITOR) 20 MG tablet Take 20 mg by mouth nightly       sertraline (ZOLOFT) 100 MG tablet Take 75 mg by mouth daily      baclofen (LIORESAL) 10 MG tablet Take 10 mg by mouth 2 times daily      calcium-vitamin D (OSCAL 500/200 D-3) 500-200 MG-UNIT per tablet Take 1 tablet by mouth 3 times daily      cilostazol (PLETAL) 100 MG tablet Take 100 mg by mouth 2 times daily      famotidine (PEPCID) 10 MG tablet Take 10 mg by mouth 2 times daily.  ondansetron (ZOFRAN-ODT) 4 MG disintegrating tablet Take 4 mg by mouth every 8 hours as needed for Nausea or Vomiting.  zonisamide (ZONEGRAN) 100 MG capsule Take 400 mg by mouth nightly Indications: 3 caps po daily        Allergies   Allergen Reactions    Ciprofloxacin Hives    Depacon [Valproic Acid] Swelling    Sulfa Antibiotics Itching       Nursing Notes Reviewed    Physical Exam:  ED Triage Vitals [03/26/19 1911]   Enc Vitals Group      /89      Pulse 123      Resp 17      Temp 97.8 °F (36.6 °C)      Temp Source Oral      SpO2 93 %      Weight 161 lb (73 kg)      Height 5' (1.524 m)      Head Circumference       Peak Flow       Pain Score       Pain Loc       Pain Edu? Excl. in [address]? General :Patient is awake alert oriented person place and time no acute distress nontoxic appearing  HEENT: Pupils are equally round and reactive to light extraocular motors are intact conjunctivae clear sclerae white there is no injection no icterus. Nose without any rhinorrhea or epistaxis. Oral mucosa is moist no exudate buccal mucosa shows no ulcerations.  Uvula is midline    Neck: Neck is supple full range of motion trachea midline thyroid nonpalpable  Cardiac: Heart regular rate rhythm no murmurs rubs clicks or gallops  Lungs: Lungs are clear to auscultation there is no wheezing rhonchi or rales. There is no use of accessory muscles no nasal flaring identified. Chest wall: There is no tenderness to palpation over the chest wall or over ribs  Abdomen: Abdomen is soft nontender nondistended. There is no firm or pulsatile masses no rebound rigidity or guarding negative Cooper's negative McBurney, no peritoneal signs  Suprapubic:  there is no tenderness to palpation over the external bladder   Musculoskeletal: 5 out of 5 strength in all 4 extremities full flexion extension abduction and adduction supination pronation of all extremities and all digits. No obvious muscle atrophy is noted. No focal muscle deficits are appreciated  Dermatology: Skin is warm and dry there is no obvious abscesses lacerations or lesions noted  Psych: Mentation is grossly normal cognition is grossly normal. Affect is appropriate          I have reviewed and interpreted all of the currently available lab results from this visit (if applicable):  Results for orders placed or performed during the hospital encounter of 03/26/19   Urine Culture   Result Value Ref Range    Specimen URINE CLEAN CATCH     Special Requests NONE     Total [place] Count >[id number] COL/ML     Culture ESCHERICHIA COLI  HEAVY GROWTH   (A)     Culture (NOTE)  COPY TO PHARM,3/29/19 @ 1020-SD.             Susceptibility    Escherichia coli - BACTERIAL SUSCEPTIBILITY PANEL BRUNILDA     ampicillin >16 Resistant      ciprofloxacin <=1 Sensitive      gentamicin >8 Resistant      piperacillin-tazobactam <=16 Sensitive      tobramycin <=4 Sensitive      trimethoprim-sulfamethoxazole >2/38 Resistant      ampicillin-sulbactam 16/8 Intermediate      amoxicillin-clavulanate <=8/4 Sensitive      ceFAZolin <=2 Sensitive    Culture Blood #1   Result Value Ref Range    Specimen BLOOD     Special Urinalysis Reflex to Culture   Result Value Ref Range    Color, UA LAVELLE (A) YELLOW    Clarity, UA SLIGHTLY CLOUDY (A) CLEAR    Glucose, Urine NEGATIVE NEGATIVE MG/DL    Bilirubin Urine NEGATIVE NEGATIVE MG/DL    Ketones, Urine NEGATIVE NEGATIVE MG/DL    Specific Gravity, UA 1.014 1.001 - 1.035    Blood, Urine SMALL (A) NEGATIVE    pH, Urine 7.0 5.0 - 8.0    Protein, UA >500 (HH) NEGATIVE MG/DL    Urobilinogen, Urine 1 0.2 - 1.0 MG/DL    Nitrite Urine, Quantitative NEGATIVE NEGATIVE    Leukocyte Esterase, Urine LARGE (A) NEGATIVE    RBC, UA 27 (H) 0 - 6 /HPF    WBC,  (H) 0 - 5 /HPF    Bacteria, UA MANY (A) NEGATIVE /HPF    WBC Clumps, UA MANY /HPF    Squam Epithel, UA 2 /HPF    Trichomonas, UA NONE SEEN NONE SEEN /HPF   Lactic Acid, Plasma   Result Value Ref Range    Lactate 1.1 0.4 - 2.0 mMOL/L   CBC Auto Differential   Result Value Ref Range    WBC 6.5 4.0 - 10.5 K/CU MM    RBC 3.51 (L) 4.2 - 5.4 M/CU MM    Hemoglobin 9.0 (L) 12.5 - 16.0 GM/DL    Hematocrit 31.0 (L) 37 - 47 %    MCV 88.3 78 - 100 FL    MCH 25.6 (L) 27 - 31 PG    MCHC 29.0 (L) 32.0 - 36.0 %    RDW 21.7 (H) 11.7 - 14.9 %    Platelets 902 176 - 649 K/CU MM    MPV 8.7 7.5 - 11.1 FL    Differential Type AUTOMATED DIFFERENTIAL     Segs Relative 60.0 36 - 66 %    Lymphocytes % 31.0 24 - 44 %    Monocytes % 6.2 (H) 0 - 4 %    Eosinophils % 2.2 0 - 3 %    Basophils % 0.3 0 - 1 %    Segs Absolute 3.9 K/CU MM    Lymphocytes # 2.0 K/CU MM    Monocytes # 0.4 K/CU MM    Eosinophils # 0.1 K/CU MM    Basophils # 0.0 K/CU MM    Nucleated RBC % 0.0 %    Total Nucleated RBC 0.0 K/CU MM    Total Immature Neutrophil 0.02 K/CU MM    Immature Neutrophil % 0.3 0 - 0.43 %   Basic Metabolic Panel   Result Value Ref Range    Sodium 134 (L) 135 - 145 MMOL/L    Potassium 3.8 3.5 - 5.1 MMOL/L    Chloride 104 99 - 110 mMol/L    CO2 17 (L) 21 - 32 MMOL/L    Anion Gap 13 4 - 16    BUN 19 6 - 23 MG/DL    CREATININE 0.6 0.6 - 1.1 MG/DL    Glucose 97 70 - 99 MG/DL    Calcium Final Result   Shallow inspiration and mild vascular congestion. EKG (if obtained):   Please See Note of attending physician for EKG interpretation. Chart review shows recent radiograph(s):  No results found. MDM:   Patient presents today with change in behavior. Transient hypoxia. CTA revealed no evidence of pulmonary embolism or acute cardiopulmonary processes. Patient is noted to have urinary tract infection on urinalysis so IV antibiotics and fluids are provided. On-call hospitalist Was consulted regarding patient. After thorough discussion regarding patient's history, physical exam.  laboratory values, radiographic evidence (if applicable  theymyself as well as my attending physician agreed Given the patient's presenting concerns, medical history and clinical findings, the patient will be admitted at this time to undergo further evaluation and disposition. . During patient's entire stay in the ED patient remained stable and comfortable. Analgesia is well-controlled. Patient will be admitted for all information regarding ongoing management and care of patient please see note of Admitting physician. I saw this patient in conjunction with attending physician Dr. Aysha Dean    All EKG interpretations are performed by Attending physician              BP (!) 138/94   Pulse 82   Temp 99.1 °F (37.3 °C) (Oral)   Resp 18   Ht 5' (1.524 m)   Wt 168 lb 11.2 oz (76.5 kg)   SpO2 98%   BMI 32.95 kg/m²       Clinical Impression:  1.  Sepsis due to urinary tract infection [facility])        Disposition referral (if applicable):  Ramakrishna Shafer DO  [address] [place] [address] Rd  [phone]          Disposition medications (if applicable):  Discharge Medication List as of 3/29/2019  6:41 PM      START taking these medications    Details   amoxicillin-clavulanate (AUGMENTIN) 875-125 MG per tablet Take 1

## 2019-03-26 NOTE — ED NOTES
Bed: ED-36  Expected date:   Expected time:   Means of arrival:   Comments:  sixto Brooks, LAN  03/26/19 4600

## 2019-03-27 PROBLEM — N39.0 UTI (URINARY TRACT INFECTION): Status: ACTIVE | Noted: 2019-01-01

## 2019-03-27 NOTE — ED PROVIDER NOTES
Emergency [address] EMERGENCY DEPARTMENT    Patient: Alana Noe  MRN: [id number]  : 1965  Date of Evaluation: 3/26/2019  ED Supervising Physician: Brandee Sin MD    I independently examined and evaluated Alana Noe. In brief, Alana Noe is a 52 y.o. female that presents to the emergency department for nursing facility for tachycardia and fatigue. Denies chest pain, shortness of breath, abdominal pain, nausea or vomiting. Focused exam: Well-appearing patient in no acute distress. Cardiac exam reveals tachycardia without any murmurs. Lungs sounds are clear bilaterally with no increased work of breathing. Abdomen is soft, nontender, nondistended. Brief ED course/MDM: Patient in no acute distress. She is tachycardic. Transiently hypoxic and CTA ordered with no pulmonary embolism. Workup significant for UTI. Started on IV fluids and given Rocephin. Plan to admit for further management. 12 lead EKG as interpreted by me reveals sinus tachycardia. Axis is normal. There are no ischemic ST elevations or other suspicious ST changes;  QRS interval is narrow, QT interval is not prolonged. Final interpretation: Sinus tachycardia      All diagnostic, treatment, and disposition decisions were made by myself in conjunction with the YSABEL. For all further details of the patient's emergency department visit, please see their documentation.     (Please note that portions of this note may have been completed with a voice recognition program. Efforts were made to edit the dictations but occasionally words are mis-transcribed.)    MD Christi Nino MD  19 0925

## 2019-03-27 NOTE — PROGRESS NOTES
Skin Assess for admission:  Patient has redness on her buttocks, bilateral legs are dry and scaly, bruising on BUE.   Verified by Kassie Zayas, NA

## 2019-03-27 NOTE — H&P
HISTORY AND PHYSICAL  (Hospitalist, Internal Medicine)  IDENTIFYING INFORMATION   PATIENT:  Massimo Posey  MRN:  [id number]  ADMIT DATE: 3/26/2019  TIME OF EVALUATION: 3/27/2019 1:17 AM    CHIEF COMPLAINT   Tachycardia, AMS, fatigue, generalized weakness    HISTORY OF PRESENT ILLNESS   Massimo Posey is a 52 y.o. female with a past medical history of CP, seizure, DMII, HTN, HLD, chronic pain who presents from nursing home with 1 day hx of sudden Tachycardia, AMS, fatigue, generalized weakness. Found to have a UTI. At baseline, she uses a depends. She felt that she had a UTI given the constellation of symptoms. Denies any fever or chills but was progressively weaker and more fatigued during the day.      PAST MEDICAL, SURGICAL, FAMILY, and SOCIAL HISTORY     Past Medical History:   Diagnosis Date    Anemia     Anxiety     Chronic pain     Constipation     CP (cerebral palsy) (HCC)     Depression     Diabetes mellitus ([facility] [address]     Dissociative disorder     Esophageal reflux disease     Functional digestive disorder     Hyperlipidemia     Hypertension     Kidney failure     Migraine     Obesity     Peripheral vascular disease (HCC)     PVD (peripheral vascular disease) (HCC)     Seizures (HCC)     SOB (shortness of breath)     Somatoform disorder      Past Surgical History:   Procedure Laterality Date    CHOLECYSTECTOMY      FOOT SURGERY      KIDNEY STONE SURGERY       Family History   Problem Relation Age of Onset    Hypertension Other      Family Hx of HTN  Family Hx as reviewed above, otherwise non-contributory  Social History     Socioeconomic History    Marital status: Single     Spouse name: None    Number of children: None    Years of education: None    Highest education level: None   Occupational History    None   Social Needs    Financial resource strain: None    Food insecurity:     Worry: None     Inability: None    Transportation needs:     Medical: None     Non-medical: None Tobacco Use    Smoking status: Never Smoker    Smokeless tobacco: Never Used   Substance and Sexual Activity    Alcohol use: No    Drug use: No    Sexual activity: Not Currently   Lifestyle    Physical activity:     Days per week: None     Minutes per session: None    Stress: None   Relationships    Social connections:     Talks on phone: None     Gets together: None     Attends Christianity service: None     Active member of club or organization: None     Attends meetings of clubs or organizations: None     Relationship status: None    Intimate partner violence:     Fear of current or ex partner: None     Emotionally abused: None     Physically abused: None     Forced sexual activity: None   Other Topics Concern    None   Social History Narrative    None       MEDICATIONS   Medications Prior to Admission  Not in a hospital admission. Current Medications  Current Facility-Administered Medications   Medication Dose Route Frequency Provider Last Rate Last Dose    0.9 % sodium chloride bolus  1,000 mL Intravenous Once Melina Davis PA-C 1,000 mL/hr at 03/27/19 0028 1,000 mL at 03/27/19 0028    sodium chloride flush 0.9 % injection 10 mL  10 mL Intravenous BID Michele Barbour MD   10 mL at 03/26/19 9317     Current Outpatient Medications   Medication Sig Dispense Refill    torsemide (DEMADEX) 20 MG tablet Take 1 tablet by mouth every other day 30 tablet 1    gabapentin (NEURONTIN) 300 MG capsule Take 300 mg by mouth 2 times daily      acetaminophen (TYLENOL) 325 MG tablet Take 650 mg by mouth every 4 hours as needed for Pain      loperamide (IMODIUM) 2 MG capsule Take 2 mg by mouth as needed for Diarrhea      cholestyramine (QUESTRAN) 4 G packet Take 1 packet by mouth every 12 hours as needed (diarrhea)      HYDROcodone-acetaminophen (NORCO) 5-325 MG per tablet Take 1 tablet by mouth 2 times daily as needed for Pain . (Patient taking differently: Take 1 tablet by mouth every 12 hours  . ) 10 tablet 0    butalbital-acetaminophen-caffeine (FIORICET, ESGIC) -40 MG per tablet Take 1 tablet by mouth every 6 hours as needed for Headaches or Migraine 5 tablet 0    metFORMIN (GLUCOPHAGE) 500 MG tablet Take 1 tablet by mouth daily (with breakfast) 60 tablet 3    levETIRAcetam (KEPPRA) 1000 MG tablet Take 1 tablet by mouth 2 times daily (Patient taking differently: Take 1,500 mg by mouth 2 times daily ) 60 tablet 3    metoprolol (LOPRESSOR) 100 MG tablet Take 1 tablet by mouth 2 times daily 60 tablet 3    atorvastatin (LIPITOR) 20 MG tablet Take 20 mg by mouth nightly       sertraline (ZOLOFT) 100 MG tablet Take 75 mg by mouth daily      baclofen (LIORESAL) 10 MG tablet Take 10 mg by mouth 2 times daily      calcium-vitamin D (OSCAL 500/200 D-3) 500-200 MG-UNIT per tablet Take 1 tablet by mouth 3 times daily      cilostazol (PLETAL) 100 MG tablet Take 100 mg by mouth 2 times daily      famotidine (PEPCID) 10 MG tablet Take 10 mg by mouth 2 times daily.  ondansetron (ZOFRAN-ODT) 4 MG disintegrating tablet Take 4 mg by mouth every 8 hours as needed for Nausea or Vomiting.  zonisamide (ZONEGRAN) 100 MG capsule Take 400 mg by mouth nightly Indications: 3 caps po daily            Allergies  Allergies   Allergen Reactions    Ciprofloxacin Hives    Depacon [Valproic Acid] Swelling    Sulfa Antibiotics Itching       REVIEW OF SYSTEMS   Within above limitations. 14 point review of systems reviewed. Pertinent positive or negative as per HPI or otherwise negative per 14 point systems review. PHYSICAL EXAM     Wt Readings from Last 3 Encounters:   03/26/19 161 lb (73 kg)   03/06/17 191 lb 4.8 oz (86.8 kg)   01/13/17 185 lb (83.9 kg)       Blood pressure (!) 124/91, pulse 114, temperature 97.8 °F (36.6 °C), temperature source Oral, resp. rate 17, height 5' (1.524 m), weight 161 lb (73 kg), SpO2 93 %, not currently breastfeeding. General - AAO x 3  Psych - Appropriate affect/speech.  No agitation  Eyes - Eye lids intact. No scleral icterus  ENT - Lips wnl. External ear clear/dry/intact. No thyromegaly on inspection  Neuro - No gross peripheral or central neuro deficits on inspection  Heart - Sinus tachycardia. RRR. S1 and S2 present. No elevated JVD appreciated  Lung - Adequate air entry b/l, No crackles/wheezes appreciated  GI - Soft. No guarding/rigidity. BS+   - No CVA/suprapubic tenderness or palpable bladder distension  Skin - b/l LE edema    LABS AND IMAGING   CBC  [unfilled]    Last 3 Hemoglobin  Lab Results   Component Value Date    HGB 10.4 03/26/2019    HGB 10.1 01/08/2019    HGB 10.8 11/26/2018     Last 3 WBC/ANC  Lab Results   Component Value Date    WBC 10.1 03/26/2019    WBC 8.3 01/08/2019    WBC 9.2 11/26/2018     No components found for: GRNLOCTYABS  Last 3 Platelets  No results found for: PLATELET  Chemistry  [unfilled]  [unfilled]  No results found for: LDH  Coagulation Studies  Lab Results   Component Value Date    INR 0.87 11/19/2014     Liver Function Studies  Lab Results   Component Value Date    ALT 16 03/26/2019    AST 25 03/26/2019    ALKPHOS 192 03/26/2019       Recent Imaging    Tish Mojica #[id number] ([id number]) (52 y.o. F) (Adm: 03/26/19)    [facility] [id number]         Imaging Results (last 7 days)          Procedure Component Value Ref Range Date/Time     CTA PULMONARY W CONTRAST [[id number]] Collected: 03/27/19 0008     Order Status: Completed Updated: 03/27/19 0024     Narrative:       EXAMINATION:  CTA OF THE CHEST 3/26/2019 11:24 pm    TECHNIQUE:  CTA of the chest was performed after the administration of intravenous  contrast.  Multiplanar reformatted images are provided for review.  MIP  images are provided for review. Dose modulation, iterative reconstruction,  and/or weight based adjustment of the mA/kV was utilized to reduce the  radiation dose to as low as reasonably achievable.     COMPARISON:  09/05/2016    HISTORY:  ORDERING SYSTEM PROVIDED HISTORY: pain tachycardia    FINDINGS:  Pulmonary Arteries: No central lower pulmonary emboli identified.  Segmental  subsegmental branches not well evaluated due to motion artifacts. Ghada Knee  pulmonary artery is normal in caliber. Mediastinum: Heart is normal size.  No pericardial effusion.  No suspicious  mediastinal, hilar, or axillary adenopathy identified per CT criteria. Lungs/pleura: Pleuroparenchymal bands identified both lower lobes.  Patchy  ground-glass opacities suggestive of hypoventilatory changes versus air  trapping.  No confluent airspace consolidation pleural effusion pneumothorax. Upper Abdomen: Multiple sacrum calculi left kidney.  Stable splenule left  pericolic gutter    Soft Tissues/Bones: No acute bone or soft tissue abnormality. multilevel  degenerate changes thoracic spine.  Sclerotic lesion identified involving  upper lumbar spine also unchanged.     Impression:       No evidence of central or lobar pulmonary emboli. Scattered areas of subsegmental atelectasis versus scarring.     XR CHEST STANDARD (2 VW) [[id number]] Collected: 03/26/19 2100     Order Status: Completed Specimen: Chest Updated: 03/26/19 2105     Narrative:       EXAMINATION:  TWO VIEWS OF THE CHEST    3/26/2019 7:57 pm    COMPARISON:  Chest radiograph 03/03/2017. HISTORY:  ORDERING SYSTEM PROVIDED HISTORY: tachycardia  TECHNOLOGIST PROVIDED HISTORY:  Reason for exam:->tachycardia  Ordering Physician Provided Reason for Exam: PAIN    Initial encounter.     FINDINGS:  The cardiomediastinal silhouette is unchanged.  Shallow inspiration.  Mild  vascular congestion.  No pneumothorax, consolidation, or pleural effusion is  identified.  No acute osseous abnormality.     Impression:       Shallow inspiration and mild vascular congestion.         EKG personally reviewed with rate 119, ST      Relevant labs and imaging reviewed    ASSESSMENT AND PLAN     UTI with SIRS  - IV rocephin  - urine and blood cx pend  - IVF  - trend labs    Depression  Cerebral palsy  Seizures  DMII - ISS for now  HTN  HLD  LE edema - hold diuretics for now  Chronic pain    Lovenox ppx      Case d/w ED physician    [facility], Internal Medicine  3/27/2019 at 1:17 AM

## 2019-03-27 NOTE — ED NOTES
Bed: ED-33  Expected date:   Expected time:   Means of arrival:   Comments:  Colton Song, RN  03/26/19 4598

## 2019-03-28 NOTE — PROGRESS NOTES
Hospitalist Progress Note      Name:  Romie Sue /Age/Sex: 1965  (52 y.o. female)   MRN & CSN:  [id number] & [id number] Admission Date/Time: 3/26/2019  6:56 PM   Location:  [address] [place] [address] PCP: Robinson Howard 56 Day: 3    Assessment and Plan:   Romie Sue is a 52 y.o.  female  who presents with Tachycardia and Fatigue    uti - continue iv abx. Waiting for UCx    Planning for [place] back to [place] - no precert needed. Diet DIET CARB CONTROL;   DVT Prophylaxis [x] Lovenox, []  Heparin, [] SCDs, []No VTE prophylaxis, patient ambulating   GI Prophylaxis [x] PPI, [] H2 Blocker, [] No GI prophylaxis, patient is receiving diet/Tube Feeds   Code Status Full Code   Disposition Patient requires continued admission due to iv abx   MDM [] Low, [x] Moderate,[]  High  Patient's risk as above due to acute illness with systemic system      History of Present Illness:     Pt S&E. No family at bedside. Denies cp sob or ha.     10-14 point ROS reviewed negative, unless as noted above    Objective: Intake/Output Summary (Last 24 hours) at 3/28/2019 1020  Last data filed at 3/28/2019 0938  Gross per 24 hour   Intake 10 ml   Output --   Net 10 ml      Vitals:   Vitals:    19 0336   BP: 125/79   Pulse: 80   Resp: 24   Temp: 97.7 °F (36.5 °C)   SpO2: 96%     Physical Exam:    GEN Awake female, laying in bed in no apparent distress. Appears given age. EYES Pupils are equally round. No scleral discharge  HENT Atraumatic and symmetric head  NECK No apparent thyromegaly  RESP Symmetric chest movement while on room air. CARDIO/VASC Peripheral pulses equal bilaterally and palpable. No peripheral edema. GI Abdomen is not distended. Rectal exam deferred.  Moore catheter is not present. HEME/LYMPH No petechiae or ecchymoses. MSK Spontaneous movement of BL upper extremities  SKIN Normal coloration, warm, dry. NEURO Cranial nerves appear grossly intact  PSYCH Awake, alert. oriented    Medications:   Medications:    atorvastatin  20 mg Oral Nightly    baclofen  10 mg Oral BID    calcium-vitamin D  1 tablet Oral TID    cilostazol  100 mg Oral BID    famotidine  10 mg Oral BID    gabapentin  300 mg Oral BID    levETIRAcetam  1,500 mg Oral BID    metoprolol  100 mg Oral BID    sertraline  75 mg Oral Daily    zonisamide  400 mg Oral Nightly    enoxaparin  40 mg Subcutaneous Daily    cefTRIAXone (ROCEPHIN) IV  1 g Intravenous Q24H    insulin lispro  0-6 Units Subcutaneous TID WC    insulin lispro  0-3 Units Subcutaneous Nightly    sodium chloride flush  10 mL Intravenous BID      Infusions:    dextrose       PRN Meds:   acetaminophen 650 mg Q4H PRN   cholestyramine light 1 packet Q12H PRN   HYDROcodone-acetaminophen 1 tablet BID PRN   glucose 15 g PRN   dextrose 12.5 g PRN   glucagon (rDNA) 1 mg PRN   dextrose 100 mL/hr PRN         Electronically signed by Peace Wong DO on 3/28/2019 at 10:20 AM

## 2019-03-28 NOTE — PLAN OF CARE
Problem: Pain:  Goal: Pain level will decrease  Description  Pain level will decrease  Outcome: Ongoing  Goal: Control of acute pain  Description  Control of acute pain  Outcome: Ongoing  Goal: Control of chronic pain  Description  Control of chronic pain  Outcome: Ongoing     Problem: Falls - Risk of:  Goal: Will remain free from falls  Description  Will remain free from falls  Outcome: Ongoing  Goal: Absence of physical injury  Description  Absence of physical injury  Outcome: Ongoing     Problem: Risk for Impaired Skin Integrity  Goal: Tissue integrity - skin and mucous membranes  Description  Structural intactness and normal physiological function of skin and  mucous membranes.   Outcome: Ongoing

## 2019-03-28 NOTE — DISCHARGE INSTR - COC
Continuity of Care Form    Patient Name: Justin Kang   :  1965  MRN:  [id number]    Admit date:  3/26/2019  Discharge date:  3/29/19    Code Status Order: Full Code   Advance Directives:   [facility] Documentation     Date/Time Healthcare Directive Type of Healthcare Directive Copy in [facility] [address] Agent's Name Healthcare Agent's Phone Number    [phone]  No, patient does not have an advance directive for healthcare treatment -- -- -- -- --          Admitting Physician:  Kalpana Lake MD  PCP: Carmen Monique DO    Discharging Nurse: [facility] Unit/Room#: 9234/6257-W  Discharging Unit Phone Number: [phone]    Emergency Contact:   Extended Emergency Contact Information  Primary Emergency Contact: [facility] Phone: [phone]  Mobile Phone: [phone]  Relation: Brother/Sister  Secondary Emergency Contact: Lucina Santana  [place] Phone: [phone]  Relation: Brother/Sister    Past Surgical History:  Past Surgical History:   Procedure Laterality Date    CHOLECYSTECTOMY      FOOT SURGERY      KIDNEY STONE SURGERY         Immunization History:   Immunization History   Administered Date(s) Administered    Influenza Virus Vaccine 10/15/2014    Pneumococcal Polysaccharide ([id number]) 2014       Active Problems:  Patient Active Problem List   Diagnosis Code    Atypical chest pain R07.89    HTN (hypertension) I10    HLD (hyperlipidemia) E78.5    Morbid obesity ([address] E66.01    Cerebral palsy ([address] G80.9    Breast mass N63.0    Edema of both legs R60.0    ARF (acute renal failure) ([address] N17.9    Acute congestive heart failure (HCC) I50.9    Seizure disorder ([address] G40.909    UTI (urinary tract infection) N39.0       Isolation/Infection:   Isolation          Contact        Patient Infection Status     Infection Encounter Level?  Onset Date Added Added By Resolved Resolved By Review Date    Acinetobacter multidrug resistant No  08/09/17 Nicole Montes RN 03/27/19 Brock Dowd LPN     9/94/59 urine    ESBL (Extended Spectrum Beta Lactamase) No  07/10/17 Brock Dowd LPN 09/73/03 Brock Dowd LPN     1/6/91 Klebsiella pneumoniae urine    MRSA No  08/29/16 Diane Rodriguez RN 03/27/19 Brock Dowd LPN     5/0/67 nares; 3/18/16, delgado          Nurse Assessment:  Last Vital Signs: /79   Pulse 80   Temp 97.7 °F (36.5 °C) (Oral)   Resp 24   Ht 5' (1.524 m)   Wt 168 lb 11.2 oz (76.5 kg)   SpO2 96%   BMI 32.95 kg/m²     Last documented pain score (0-10 scale): Pain Level: 6  Last Weight:   Wt Readings from Last 1 Encounters:   03/27/19 168 lb 11.2 oz (76.5 kg)     Mental Status:  oriented, alert, coherent, logical, thought processes intact and able to concentrate and follow conversation    IV Access:  - None    Nursing Mobility/ADLs:  Walking   Dependent  Transfer  Dependent  Bathing  Dependent  Dressing  Dependent  Toileting  Dependent  Feeding  Assisted  Med Admin  Assisted  Med Delivery   whole    Wound Care Documentation and Therapy:        Elimination:  Continence:   · Bowel: No  · Bladder: No  Urinary Catheter: None   Colostomy/Ileostomy/Ileal Conduit: No       Date of Last BM: 3/29/19    Intake/Output Summary (Last 24 hours) at 3/28/2019 1041  Last data filed at 3/28/2019 0938  Gross per 24 hour   Intake 10 ml   Output --   Net 10 ml     I/O last 3 completed shifts: In: 120 [P.O.:120]  Out: -     Safety Concerns:      At Risk for Falls    Impairments/Disabilities:      Paralysis - BLE and ***      Patient's personal belongings (please select all that are sent with patient):  Glasses    RN SIGNATURE:  Electronically signed by Alena Villareal RN on 3/29/19 at 6:33 PM    CASE MANAGEMENT/SOCIAL WORK SECTION    Inpatient Status Date: ***    Readmission Risk Assessment Score:  Readmission Risk              Risk of Unplanned Readmission:        12           Discharging to Facility/ Agency   · Name: · Address:  · Phone:  · Fax:    Dialysis Facility (if applicable)   · Name:  · Address:  · Dialysis Schedule:  · Phone:  · Fax:    / signature: {Esignature:010980875}    PHYSICIAN SECTION    Prognosis: {Prognosis:7263542080}    Condition at Discharge: [address] Juan Patient Condition:658491875}    Rehab Potential (if transferring to Rehab): {Prognosis:4854543694}    Recommended Labs or Other Treatments After Discharge: ***    Physician Certification: I certify the above information and transfer of Stepan Huang  is necessary for the continuing treatment of the diagnosis listed and that she requires {Admit to Appropriate Level of Care:61469} for {GREATER/LESS:422445186} 30 days.      Update Admission H&P: {CHP DME Changes in SWXOX:217891709}  Nutrition Therapy:  Current Nutrition Therapy:   [address] ERIC Diet List:450559831}    Routes of Feeding: {CHP DME Other Feedings:338194903}  Liquids: {Slp liquid thickness:81389}  Daily Fluid Restriction: {CHP DME Yes amt example:427557854}  Last Modified Barium Swallow with Video (Video Swallowing Test): {Done Not Done CSDK:094847722}    Treatments at the Time of Hospital Discharge:   Respiratory Treatments: ***  Oxygen Therapy:  {Therapy; copd oxygen:97179}  Ventilator:    { REMI Vent QNZZ:995767020}    Rehab Therapies: {THERAPEUTIC INTERVENTION:4756185554}  Weight Bearing Status/Restrictions: { CC Weight Bearin}  Other Medical Equipment (for information only, NOT a DME order):  {EQUIPMENT:229531415}  Other Treatments: ***    PHYSICIAN SIGNATURE:  {Esignature:247184284}

## 2019-03-29 NOTE — DISCHARGE SUMMARY
Discharge Summary    Name:  Antonio Harding /Age/Sex: 1965  (52 y.o. female)   MRN & CSN:  [id number] & [id number] Admission Date/Time: 3/26/2019  6:56 PM   Attending:  Ligia Hammonds DO Discharging Physician: Ligia Hammonds DO     HPI and Hospital Course:   Antonio Harding is a 52 y.o.  female  who presents with:    1. Sepsis due to UTI, found to be Ecoli. Medications as below. 2. HTN, HLD  3. DM2    The patient expressed appropriate understanding of and agreement with the discharge recommendations, medications, and plan.      Consults this admission:  Mohan Chris HOSPITALIST    Discharge Instruction:   Follow up appointments: none  Primary care physician:  within 2 weeks    Diet:  General/cardiac/ADA/as tolerated  Activity: {discharge activity: as tolerated  Disposition: Discharged to:   [x]Home, []C, []SNF, []Acute Rehab, []Hospice   Condition on discharge: Stable    Discharge Medications:      Philomena Fix   Home Medication Instructions [id number]    Printed on:19   Medication Information                      acetaminophen (TYLENOL) 325 MG tablet  Take 650 mg by mouth every 4 hours as needed for Pain             amoxicillin-clavulanate (AUGMENTIN) 875-125 MG per tablet  Take 1 tablet by mouth 2 times daily for 7 days             atorvastatin (LIPITOR) 20 MG tablet  Take 20 mg by mouth nightly              baclofen (LIORESAL) 10 MG tablet  Take 10 mg by mouth 2 times daily             butalbital-acetaminophen-caffeine (FIORICET, ESGIC) -40 MG per tablet  Take 1 tablet by mouth every 6 hours as needed for Headaches or Migraine             calcium-vitamin D (OSCAL 500/200 D-3) 500-200 MG-UNIT per tablet  Take 1 tablet by mouth 3 times daily             cholestyramine (QUESTRAN) 4 G packet  Take 1 packet by mouth every 12 hours as needed (diarrhea)             cilostazol (PLETAL) 100 MG tablet  Take 100 mg by mouth 2 times daily             famotidine (PEPCID) 10 MG tablet  Take 10 mg by mouth 2 times daily. gabapentin (NEURONTIN) 300 MG capsule  Take 300 mg by mouth 2 times daily             HYDROcodone-acetaminophen (NORCO) 5-325 MG per tablet  Take 1 tablet by mouth 2 times daily as needed for Pain .             levETIRAcetam (KEPPRA) 1000 MG tablet  Take 1 tablet by mouth 2 times daily             loperamide (IMODIUM) 2 MG capsule  Take 2 mg by mouth as needed for Diarrhea             metFORMIN (GLUCOPHAGE) 500 MG tablet  Take 1 tablet by mouth daily (with breakfast)             metoprolol (LOPRESSOR) 100 MG tablet  Take 1 tablet by mouth 2 times daily             ondansetron (ZOFRAN-ODT) 4 MG disintegrating tablet  Take 4 mg by mouth every 8 hours as needed for Nausea or Vomiting.             sertraline (ZOLOFT) 100 MG tablet  Take 75 mg by mouth daily             torsemide (DEMADEX) 20 MG tablet  Take 1 tablet by mouth every other day             zonisamide (ZONEGRAN) 100 MG capsule  Take 400 mg by mouth nightly Indications: 3 caps po daily                  Objective Findings at Discharge:   BP (!) 138/94   Pulse 82   Temp 99.1 °F (37.3 °C) (Oral)   Resp 18   Ht 5' (1.524 m)   Wt 168 lb 11.2 oz (76.5 kg)   SpO2 98%   BMI 32.95 kg/m²            PHYSICAL EXAM   GEN Awake female, laying in bed in no apparent distress. Appears given age. EYES Pupils are equally round. No scleral discharge  HENT Atraumatic and symmetric head  NECK No apparent thyromegaly  RESP Symmetric chest movement while on room air. CARDIO/VASC Peripheral pulses equal bilaterally and palpable. No peripheral edema. GI Abdomen is not distended. Rectal exam deferred.  Moore catheter is not present. HEME/LYMPH No petechiae or ecchymoses. MSK Spontaneous movement of BL upper extremities  SKIN Normal coloration, warm, dry. NEURO Cranial nerves appear grossly intact  PSYCH Awake, alert.     BMP/CBC  Recent Labs     03/26/19 2015 03/28/19  0825   * 134*   K 3.7 3.8   CL 96* 104 CO2 23 17*   BUN 25* 19   CREATININE 0.8 0.6   WBC 10.1 6.5   HCT 34.7* 31.0*    334     SIGNIFICANT IMAGING AND LABS:  Impression:  cta chest       No evidence of central or lobar pulmonary emboli. Scattered areas of subsegmental atelectasis versus scarring.      Discharge Time of 36 minutes    Electronically signed by Javon Levine DO on 3/29/2019 at 4:49 PM

## 2019-03-29 NOTE — CARE COORDINATION
If Pt discharged over the weekend. Please arrange transportation with either [facility] [phone] or [facility] [phone] Pt choice. Contact Pt family. Complete RN part of the [address]. Complete packet by printing AVS with ERIC, H&P, and facesheet. Call [phone] to give report. Thanks.  Kerline Ivy   Electronically signed by AMAURI Bray on 3/29/2019 at 3:42 PM

## 2019-03-29 NOTE — PROGRESS NOTES
Nurse report given to [facility] at [facility]. Pt is being picked up my med trans at [address] [place] [address]. All belongings packed. Iv removed. Patient is alert oriented and agreeable to plan.

## 2019-04-10 PROBLEM — B36.9 DERMATITIS FUNGAL: Status: ACTIVE | Noted: 2019-01-01

## 2019-04-10 NOTE — PROGRESS NOTES
Wound Care Initial Visit      Cadence Hassan  AGE: 52 y.o. GENDER: female  : 1965  EPISODE DATE:  4/10/2019   Referred by:Dr. Cary Mary    Subjective:     CHIEF COMPLAINT RASH POSTERIOR BACK, SIDES AND GROIN     HISTORY of PRESENT ILLNESS      Cadence Hassan is a 52 y.o. female who presents to the [address] [place] [address] for an initial visit for evaluation and treatment of Acute fungal dermatitis. Scattered across posterior back, sides and groin. The condition is of moderate severity. The rash has been present for less than 24 hours. The underlying cause is thought to be fungal dermatitis. The patients care to date has included Lotrisone. The patient has significant underlying medical conditions as below.      Wound Pain Timing/Severity: none  Quality of pain: N/A  Severity of pain:  0 / 10   Modifying Factors: diabetes, obesity, incontinence of stool, incontinence of urine and multiple skin folds  Associated Signs/Symptoms: none        PAST MEDICAL HISTORY        Diagnosis Date    Anemia     Anxiety     Chronic pain     Constipation     CP [facility]     Depression     Diabetes mellitus [facility]     Dissociative disorder     Esophageal reflux disease     Functional digestive disorder     Hyperlipidemia     Hypertension     Kidney failure     Migraine     Obesity     Peripheral vascular disease ([address]     PVD (peripheral vascular disease) [facility]     Seizures ([facility]     SOB (shortness of breath)     Somatoform disorder        PAST SURGICAL HISTORY    Past Surgical History:   Procedure Laterality Date    CHOLECYSTECTOMY      FOOT SURGERY      KIDNEY STONE SURGERY         FAMILY HISTORY    Family History   Problem Relation Age of Onset    Hypertension Other        SOCIAL HISTORY    Social History     Tobacco Use    Smoking status: Never Smoker    Smokeless tobacco: Never Used   Substance Use Topics    Alcohol use: No    Drug use: No       ALLERGIES    Allergies   Allergen Reactions    1-0.05 % twice daily for days. Keep skin dry with wicking Interdry dressing, keep Interdry in groin creases and under abdominal folds as well to wick moisture. Change Interdry as needed. Provide frequent pericare, keep skin clean and dry, may use antifungal powder as needed.     Electronically signed by Marsh Pallas, APRN - CNP on 4/10/2019 at 6:50 PM

## 2019-04-26 PROBLEM — L03.213 PRESEPTAL CELLULITIS: Status: ACTIVE | Noted: 2019-01-01

## 2019-04-26 PROBLEM — N39.0 UTI (URINARY TRACT INFECTION): Status: RESOLVED | Noted: 2019-01-01 | Resolved: 2019-01-01

## 2019-04-26 NOTE — ED NOTES
Bed: ED-27  Expected date:   Expected time:   Means of arrival:   Comments:  LAN Ayala  04/26/19 4258

## 2019-04-26 NOTE — ED NOTES
Pt has had a rash with blisters to left side of face extending from forehead down left side of face. Pt left eye is swollen shut. Pt was dx with shingles yesterday and was given Valtrex.      Sheba Rodriguez RN  04/26/19 7566

## 2019-04-26 NOTE — CARE COORDINATION
- room # 27 --pt is a readmit risk -- seen in ER today for a \"Rash\" Pt has a recent diagnosis of shingles and treatment with Valtrex. Pt was an admission to the hospital ---3/27---thru ---3/29----for sepsis  Secondary to UTI, DM-2--was seen at a follow up for wound care for \"acute fungal dermatitis pt back, side and groin . Pt is currently a resident at the [facility] facility. Pt ws admitted out of concern for Herpes Zoster ophthalmic.  Pt already under the care of SNF-----[facility] / [facility]

## 2019-04-26 NOTE — ED NOTES
Bedside report received patient reports that she is allergic to contrast and states causes her to itch. Dr Casie Szymanski made aware.       Hailey Mendez RN  04/26/19 1922

## 2019-04-26 NOTE — ED PROVIDER NOTES
I independently examined and evaluated Mirtha Reynaga. In brief their history revealed a 52-year-old female presents complaining of left-sided headache type pain drainage rash or her head. Denies any fevers nausea vomiting chest pain short of breath abdominal pain. History of cerebral palsy denies any vision changes no history of shingles    Their focused exam revealed *alert oriented female resting in bed no distress normocephalic atraumatic sclera clear bilaterally does have what appears to be herpes zoster left side of face for head no Gino Cartagena sign no Cheema sign. Pupils equal reactive bilaterally extra ocular motion intact bilaterally does have blepharitis periorbital cellulitis no proptosis crit nerves otherwise intact airway normal lungs clear heart tachycardic regular rhythm 2+ pulses throughout abdomen soft obese nontender bowel sounds present normal.  No neck rigidity otherwise skin has no rash or swelling otherwise except for shingles to her left face forehead scalp    ED course: Patient seen with PA please see his note. Patient here with herpes zoster, shingles to her left face forehead likely involvement no proptosis no oropharyngeal entrapment imaging shows perioral cellulitis given antibiotics also given acyclovir for shingles ophthalmology was consulted. Patient for observation given these findings. Otherwise patient stable. Total critical care time today provided was at least 30 minutes. This excludes seperately billable procedure. Critical care time provided for reviewing labs, images consulting opthomology giving antibiotics giving antivirals, consulting medicine that required close evaluation and/or intervention with concern for patient decompensation. All diagnostic, treatment, and disposition decisions were made by myself in conjunction with the Advanced Practice Provider.     For all further details of the patient's emergency department visit, please see the Advanced

## 2019-04-26 NOTE — PROGRESS NOTES
1 x ER Consult ; Vanco Iv 1 x dose. VANCOMYCIN PK EVALUATION - CONSULT:  By:   Divya Grimes DO [[id number]]   Authorizing Provider: ИВАН Singer      INFECTION BEING TREATED = r/o sepsis, r/o UTI      Vanco TR goal = 15-20  CULTURES = Pending  OTHER ABT'S = Acyclovir iv x 1    CrCl cannot be calculated (Unknown ideal weight. ). Recent Labs     04/26/19  1753   WBC 8.6   BUN 21   CREATININE 0.8     DOSING PLAN COMMENTS:   -52 yof, 74kg  -renal, scr= 0.8.    THUS:   -give 1x  VANCOMYCIN 1500mg [20.1mg/kg/dose] ivpb , asap.     Daryle Marseilles, [facility]  4/26/2019 @ 7:04 PM  _______________________________________________________________________

## 2019-04-27 NOTE — PROGRESS NOTES
PHARMACY TO DOSE VANCOMYCIN PER DR Frank Cutler    INFECTION BEING TREATED = Pre-septal cellulitis  CULTURES = Blood (Pending)  OTHER ABT'S = Acyclovir    AGE = 52 y.o.     SEX = female  HEIGHT = 4' 11.84\" (1.52 m)  Wt Readings from Last 3 Encounters:   04/26/19 165 lb (74.8 kg)   03/27/19 168 lb 11.2 oz (76.5 kg)   03/06/17 191 lb 4.8 oz (86.8 kg)     CrCl cannot be calculated (Unknown ideal weight. ).     Recent Labs     04/26/19  1753   WBC 8.6   BUN 21   CREATININE 0.8   LACTATE 0.9     DOSING PLAN COMMENTS:  -Vancomycin 1,500 mg IV given in ED; follow with  Vancomycin 1,250 mg IV Q 12 Hours  -Goal trough ~ 15    VANCO TROUGH SCHEDULED FOR 04/28/2019 @ 07:30 AM    Camila Ruiz [facility]  4/26/2019   10:47 PM  _______________________________________

## 2019-04-27 NOTE — PLAN OF CARE
Problem: Pain:  Goal: Pain level will decrease  Description  Pain level will decrease  Outcome: Ongoing  Goal: Control of acute pain  Description  Control of acute pain  Outcome: Ongoing  Goal: Control of chronic pain  Description  Control of chronic pain  Outcome: Ongoing     Problem: Risk for Impaired Skin Integrity  Goal: Tissue integrity - skin and mucous membranes  Description  Structural intactness and normal physiological function of skin and  mucous membranes.   Outcome: Ongoing     Problem: Falls - Risk of:  Goal: Will remain free from falls  Description  Will remain free from falls  Outcome: Ongoing  Goal: Absence of physical injury  Description  Absence of physical injury  Outcome: Ongoing

## 2019-04-27 NOTE — ED NOTES
Patient resting in position of comfort. Denies needs at this time.  Awaiting bed assignment        January Garza RN  04/26/19 2053

## 2019-04-27 NOTE — ED NOTES
Patient incontinent of urine given ron care. New depends placed.       Qing Reynoso RN  04/26/19 Robinson Hernandez RN  04/26/19 2022

## 2019-04-27 NOTE — PROGRESS NOTES
Skin check performed by Jono Hay RN and Kristy Yanes RN. The following were identified;  1. Redness, crusting & blisters on left forehead and left side of face  2. Redness and swelling of left eye (swollen shut)  3. Redness under right breast  4. Redness on groin folds and lower abdominal folds  5. Redness on bilat buttocks & coccyx  6. Redness on mid and lower back  7. Bruising on BLE  8.  Redness and dryness on bilat heels

## 2019-04-27 NOTE — PROGRESS NOTES
Sepsis + triggered by  and Temp 100.8. Hospitalist informed. Tylenol 650mg given  Will continue to monitor.

## 2019-04-27 NOTE — H&P
HISTORY AND PHYSICAL  (Hospitalist, Internal Medicine)  IDENTIFYING INFORMATION   PATIENT:  Mark Weller  MRN:  [id number]  ADMIT DATE: 4/26/2019  TIME OF EVALUATION: 4/26/2019 8:58 PM    CHIEF COMPLAINT   Left eye blindness    HISTORY OF PRESENT ILLNESS   Mark Weller is a 52 y.o. female with a past medical history of *CP, wheelchair bound, chronic pain, depression, DMII who resides at [facility] who presents with 3-4 days hx of progressive left eye blindness and local pain along the left forehead/ron-orbital region with mixed of crusty lesion along with spreading erythema and swelling. Symptoms has progressed to the point the her left eye is swollen shut.  Was dx at nursing home yesterday and started on valtrex but did not improve and further developed ron-orbital skin infection    Her PMH listed below:    PAST MEDICAL, SURGICAL, FAMILY, and SOCIAL HISTORY     Past Medical History:   Diagnosis Date    Anemia     Anxiety     Chronic pain     Constipation     [facility] [address]     Depression     Diabetes mellitus ([facility] [address]     Dissociative disorder     Esophageal reflux disease     Functional digestive disorder     Hyperlipidemia     Hypertension     Kidney failure     Migraine     Obesity     Peripheral vascular disease ([address]     PVD (peripheral vascular disease) (HCC)     Seizures (HCC)     SOB (shortness of breath)     Somatoform disorder      Past Surgical History:   Procedure Laterality Date    CHOLECYSTECTOMY      FOOT SURGERY      KIDNEY STONE SURGERY       Family History   Problem Relation Age of Onset    Hypertension Other      Family Hx of HTN  Family Hx as reviewed above, otherwise non-contributory  Social History     Socioeconomic History    Marital status: Single     Spouse name: None    Number of children: None    Years of education: None    Highest education level: None   Occupational History    None   Social Needs    Financial resource strain: None    Food insecurity: Worry: None     Inability: None    Transportation needs:     Medical: None     Non-medical: None   Tobacco Use    Smoking status: Never Smoker    Smokeless tobacco: Never Used   Substance and Sexual Activity    Alcohol use: No    Drug use: No    Sexual activity: Not Currently   Lifestyle    Physical activity:     Days per week: None     Minutes per session: None    Stress: None   Relationships    Social connections:     Talks on phone: None     Gets together: None     Attends Episcopal service: None     Active member of club or organization: None     Attends meetings of clubs or organizations: None     Relationship status: None    Intimate partner violence:     Fear of current or ex partner: None     Emotionally abused: None     Physically abused: None     Forced sexual activity: None   Other Topics Concern    None   Social History Narrative    None       MEDICATIONS   Medications Prior to Admission  Not in a hospital admission.     Current Medications  Current Facility-Administered Medications   Medication Dose Route Frequency Provider Last Rate Last Dose    vancomycin (VANCOCIN) 1,500 mg in dextrose 5 % 500 mL IVPB  1,500 mg Intravenous Once ИВАН Napoles 250 mL/hr at 04/26/19 2006 1,500 mg at 04/26/19 2006    sodium chloride flush 0.9 % injection 10 mL  10 mL Intravenous BID Kelley Cartagena DO         Current Outpatient Medications   Medication Sig Dispense Refill    vitamin D (CHOLECALCIFEROL) 5000 units CAPS capsule Take 5,000 Units by mouth daily      docusate sodium (COLACE) 100 MG capsule Take 100 mg by mouth 2 times daily      cephALEXin (KEFLEX) 250 MG capsule Take 250 mg by mouth 4 times daily      oxybutynin (DITROPAN) 5 MG tablet Take 5 mg by mouth 3 times daily      promethazine (PHENERGAN) 25 MG tablet Take 25 mg by mouth every 6 hours as needed for Nausea      torsemide (DEMADEX) 20 MG tablet Take 1 tablet by mouth every other day 30 tablet 1    gabapentin (NEURONTIN) 300 MG capsule Take 300 mg by mouth 2 times daily      acetaminophen (TYLENOL) 325 MG tablet Take 650 mg by mouth every 4 hours as needed for Pain      loperamide (IMODIUM) 2 MG capsule Take 2 mg by mouth as needed for Diarrhea      HYDROcodone-acetaminophen (NORCO) 5-325 MG per tablet Take 1 tablet by mouth 2 times daily as needed for Pain . (Patient taking differently: Take 1 tablet by mouth every 12 hours  . ) 10 tablet 0    butalbital-acetaminophen-caffeine (FIORICET, ESGIC) -40 MG per tablet Take 1 tablet by mouth every 6 hours as needed for Headaches or Migraine 5 tablet 0    metFORMIN (GLUCOPHAGE) 500 MG tablet Take 1 tablet by mouth daily (with breakfast) 60 tablet 3    levETIRAcetam (KEPPRA) 1000 MG tablet Take 1 tablet by mouth 2 times daily (Patient taking differently: Take 1,500 mg by mouth 2 times daily ) 60 tablet 3    sertraline (ZOLOFT) 100 MG tablet Take 75 mg by mouth daily      baclofen (LIORESAL) 10 MG tablet Take 10 mg by mouth 2 times daily      calcium-vitamin D (OSCAL 500/200 D-3) 500-200 MG-UNIT per tablet Take 1 tablet by mouth 3 times daily      cilostazol (PLETAL) 100 MG tablet Take 100 mg by mouth 2 times daily      famotidine (PEPCID) 10 MG tablet Take 10 mg by mouth 2 times daily.  ondansetron (ZOFRAN-ODT) 4 MG disintegrating tablet Take 4 mg by mouth every 8 hours as needed for Nausea or Vomiting.       cholestyramine (QUESTRAN) 4 G packet Take 1 packet by mouth every 12 hours as needed (diarrhea)      metoprolol (LOPRESSOR) 100 MG tablet Take 1 tablet by mouth 2 times daily 60 tablet 3    atorvastatin (LIPITOR) 20 MG tablet Take 20 mg by mouth nightly       zonisamide (ZONEGRAN) 100 MG capsule Take 400 mg by mouth nightly Indications: 3 caps po daily            Allergies  Allergies   Allergen Reactions    Ciprofloxacin Hives    Contrast [Iodides] Itching    Depacon [Valproic Acid] Swelling    Sulfa Antibiotics Itching       REVIEW OF SYSTEMS Within above limitations. 14 point review of systems reviewed. Pertinent positive or negative as per HPI or otherwise negative per 14 point systems review. PHYSICAL EXAM     Wt Readings from Last 3 Encounters:   04/26/19 165 lb (74.8 kg)   03/27/19 168 lb 11.2 oz (76.5 kg)   03/06/17 191 lb 4.8 oz (86.8 kg)       Blood pressure (!) 140/86, pulse 119, temperature 98.2 °F (36.8 °C), temperature source Oral, resp. rate 14, height 4' 11.84\" (1.52 m), weight 165 lb (74.8 kg), SpO2 98 %, not currently breastfeeding. General - AAO x 3  Psych - Appropriate affect/speech. No agitation  Eyes - Eye lids intact. No scleral icterus  ENT - Left eye shut, unable to open. Lips wnl. External ear clear/dry/intact. No thyromegaly on inspection  Neuro - No gross peripheral or central neuro deficits on inspection  Heart - Sinus. RRR. S1 and S2 present. No elevated JVD appreciated  Lung - Adequate air entry b/l, No crackles/wheezes appreciated  GI - Soft. No guarding/rigidity. No hepatosplenomegaly/ascites. BS+   - No CVA/suprapubic tenderness or palpable bladder distension  Skin - left facial rash wit forehead crusty lesion. LABS AND IMAGING   CBC  [unfilled]    Last 3 Hemoglobin  Lab Results   Component Value Date    HGB 9.3 04/26/2019    HGB 9.6 04/02/2019    HGB 9.0 03/28/2019     Last 3 WBC/ANC  Lab Results   Component Value Date    WBC 8.6 04/26/2019    WBC 8.4 04/02/2019    WBC 6.5 03/28/2019     No components found for: GRNLOCTYABS  Last 3 Platelets  No results found for: PLATELET  Chemistry  [unfilled]  [unfilled]  No results found for: LDH  Coagulation Studies  Lab Results   Component Value Date    INR 0.87 11/19/2014     Liver Function Studies  Lab Results   Component Value Date    ALT 11 04/26/2019    AST 11 04/26/2019    ALKPHOS 175 04/26/2019       Recent Imaging    Dolly Howardtyron #[id number] ([id number]) (52 y.o.  F) (Adm: 04/26/19)    [address], Susan. \Hospital Has a New Name and Outlook.\"" [id number]         Imaging Results (last 7 days)          Procedure Component Value Ref Range Date/Time     CT FACIAL BONES W CONTRAST [[id number]] Collected: 04/26/19 2003     Order Status: Completed Updated: 04/26/19 2010     Narrative:       EXAMINATION:  CT OF THE FACE WITH CONTRAST 4/26/2019    TECHNIQUE:  CT of the face was performed with the administration of intravenous contrast.  Multiplanar reformatted images are provided for review. Dose modulation,  iterative reconstruction, and/or weight based adjustment of the mA/kV was  utilized to reduce the radiation dose to as low as reasonably achievable. COMPARISON:  None. HISTORY:  ORDERING SYSTEM PROVIDED HISTORY: soft tissue infection left face, likely  shingles  TECHNOLOGIST PROVIDED HISTORY:  Ordering Physician Provided Reason for Exam: soft tissue infection left face,  likely shingles  Acuity: Acute  Type of Exam: Initial  Additional signs and symptoms: no  Relevant Medical/Surgical History: 75 ml isovue 370 used    FINDINGS:  There is abnormal soft tissue stranding overlying the left orbit and  extending along the left cheek.  There is no postseptal extension evident. There is no focal fluid collection identified to suggest an abscess. The nasopharynx and oropharynx are normal in appearance.  The epiglottis is  normal in appearance.  There is mild mucosal thickening within the left  sphenoid sinus.  The paranasal sinuses and mastoid air cells are otherwise  clear.     Impression:       Findings consistent with a left preseptal cellulitis and left facial  cellulitis.     No abscess identified.             Relevant labs and imaging reviewed    ASSESSMENT AND PLAN     Pre-septal cellulitis  Herpes zoster left facial infectin  Herpes ophthalmic eye infection   - ED d/w ophthalmology to assist  - IV vanco, trend level, pharmacy to assist  - IV acyclovir  - IVF ppx while on acyclovir  - eye drops  - follow clinical course    Seizure    DMII   - hold oral med  - ISS for now    Chronic pain    Cerebral palsy    Wheelchair bound - functional quadriplegia    Lives in [address] [place] [address] ppx  Case d/w ED physician    [facility], Internal Medicine  4/26/2019 at 8:58 PM

## 2019-04-27 NOTE — PROGRESS NOTES
[facility]  consulted by Dr. Juan Pablo Aldrich for monitoring and adjustment. Indication for treatment: Pre-septal cellulitis  Goal trough: 15 mcg/mL     Pertinent Laboratory Values:   Temp Readings from Last 3 Encounters:   04/27/19 98.6 °F (37 °C) (Oral)   03/29/19 99.1 °F (37.3 °C) (Oral)   03/06/17 98.1 °F (36.7 °C)     Recent Labs     04/26/19  1753   WBC 8.6   LACTATE 0.9     Recent Labs     04/26/19  1753   BUN 21   CREATININE 0.8     Estimated Creatinine Clearance: 73 mL/min (based on SCr of 0.8 mg/dL). Intake/Output Summary (Last 24 hours) at 4/27/2019 0908  Last data filed at 4/27/2019 0626  Gross per 24 hour   Intake 493 ml   Output --   Net 493 ml       Pertinent Cultures:  Date    Source    Results  4/26   Blood    Ordered  4/27   Blood    Ordered    Vancomycin level:   TROUGH:  No results for input(s): VANCOTROUGH in the last 72 hours. RANDOM:  No results for input(s): VANCORANDOM in the last 72 hours. Assessment:  WBC and temperature: No new WBC. Patient is afebrile. SCr, BUN, and urine output: Previously WNL. No new renal labs today. Day(s) of therapy: #2  Vancomycin level: To be collected. Plan:  Renal labs are WNL. No new WBC. Plan to continue with current dosing: vancomycin 1250 mg IVPB every 12 hours. Cultures are ordered/pending. Plan to check a trough level: 04-28-19 @ 0730. Pharmacy will continue to monitor patient and adjust therapy as indicated.     Thank you for the consult,    Brett Miranda, Vince, [facility]

## 2019-04-27 NOTE — ED PROVIDER NOTES
eMERGENCY dEPARTMENT eNCOUnter      PCP: Gerber Dos Santos DO    CHIEF COMPLAINT    Chief Complaint   Patient presents with    Rash     face x couple days, was dx shingles, given valtrex started yesterday     HPI    Silvia Guajardo is a 52 y.o. female who presents to the emergency department today via EMS from her long-term care facility for concern of a area of erythema, rash to the left aspect of the scalp involving the eyelid. Patient states it is a [place] been ongoing for the last week but worsening in nature. States that she saw the physician at the long-term care facility and there was concern for development of shingles but it has spread. She states she has taken Valtrex without improvement of her symptoms. She denies any eye pain. She is able to move her eye. There is no erythema to the sclera. She denies any visual disturbances. States she can have a difficulty opening the eye. She's had no history of shingles in the past.  Denies any fevers or chills. She is a diabetic, has extensive medical history. REVIEW OF SYSTEMS    Constitutional: No fever, chills  HENT: See HPI. Denies upper respiratory symptoms  Respiratory:  No cough or shortness of breath   Cardiovascular:  No chest pain  GI:  Denies abdominal pain, nausea, vomiting, or diarrhea  :  Denies any urinary symptoms or vaginal symptoms. Musculoskeletal:  See HPI. Denies backpain. Skin:  See HPI. Soheila Garcia Lymphatic:  Denies swollen glands or streaks.    Endocrine:  Denies polyuria or polydypsia   Neurologic:  Denies headache, focal weakness or sensory changes  All other review of systems are negative  See HPI and nursing notes for additional information     PAST MEDICAL HISTORY/SURGICAL HISTORY     Past Medical History:   Diagnosis Date    Anemia     Anxiety     Chronic pain     Constipation     [facility] [address]     Depression     Diabetes mellitus ([facility] [address]     Dissociative disorder     Esophageal reflux disease     Functional digestive disorder     Hyperlipidemia     Hypertension     Kidney failure     Migraine     Obesity     Peripheral vascular disease (HCC)     PVD (peripheral vascular disease) (HCC)     Seizures (HCC)     SOB (shortness of breath)     Somatoform disorder      Past Surgical History:   Procedure Laterality Date    CHOLECYSTECTOMY      FOOT SURGERY      KIDNEY STONE SURGERY         CURRENT MEDICATIONS        ALLERGIES    Allergies   Allergen Reactions    Ciprofloxacin Hives    Contrast [Iodides] Itching    Depacon [Valproic Acid] Swelling    Sulfa Antibiotics Itching       FAMILY HISTORY/SOCIAL HISTORY    Family History   Problem Relation Age of Onset    Hypertension Other      Social History     Socioeconomic History    Marital status: Single     Spouse name: None    Number of children: None    Years of education: None    Highest education level: None   Occupational History    None   Social Needs    Financial resource strain: None    Food insecurity:     Worry: None     Inability: None    Transportation needs:     Medical: None     Non-medical: None   Tobacco Use    Smoking status: Never Smoker    Smokeless tobacco: Never Used   Substance and Sexual Activity    Alcohol use: No    Drug use: No    Sexual activity: Not Currently   Lifestyle    Physical activity:     Days per week: None     Minutes per session: None    Stress: None   Relationships    Social connections:     Talks on phone: None     Gets together: None     Attends Shinto service: None     Active member of club or organization: None     Attends meetings of clubs or organizations: None     Relationship status: None    Intimate partner violence:     Fear of current or ex partner: None     Emotionally abused: None     Physically abused: None     Forced sexual activity: None   Other Topics Concern    None   Social History Narrative    None       PHYSICAL EXAM    VITAL SIGNS: /71   Pulse 102   Temp 98.5 °F (36.9 °C) (Oral)   Resp 20   Ht 5' (1.524 m)   Wt 170 lb (77.1 kg)   SpO2 91%   BMI 33.20 kg/m²    Constitutional:  Well developed, Well nourished  HENT:  Atraumatic, Normocephalic, obvious macular indurated erythema in the V1 distribution with X of ulcerations to the left aspect of the scalp involving the left eye area. PERRL, no eye drainage noted, sclera is not erythematous. Adequate extraocular movement. No pain with movement. bilateral external ears normal, no abnormal abscess or ulcerations into the ear ,no sinus tenderness, nose normal, oropharynx moist, no pharyngeal exudates. Neck- supple. No adenopathy. Respiratory:  No retractions, lungs are clear. Cardiovascular:  Heart rate tachycardic, no JVD  GI:  Soft, No tenderness   Musculoskeletal:  No acute bony deformity, no obvious joint effusion   Integument:  Patient presents with a macular indurated erythematous rash to the V1 distribution with ulcerations with palpebral conjunctival involvement. No evidence of ulcerations into the ear. No signs of drainage, abscess formation. Vascular: Dorsalis pedis pulses 2+ equal bilaterally  Neurologic:  Alert & oriented, no slurred speech.      LABS:  Results for orders placed or performed during the hospital encounter of 04/26/19   CBC auto diff   Result Value Ref Range    WBC 8.6 4.0 - 10.5 K/CU MM    RBC 3.62 (L) 4.2 - 5.4 M/CU MM    Hemoglobin 9.3 (L) 12.5 - 16.0 GM/DL    Hematocrit 31.1 (L) 37 - 47 %    MCV 85.9 78 - 100 FL    MCH 25.7 (L) 27 - 31 PG    MCHC 29.9 (L) 32.0 - 36.0 %    RDW 18.6 (H) 11.7 - 14.9 %    Platelets 465 486 - 045 K/CU MM    MPV 8.7 7.5 - 11.1 FL    Differential Type AUTOMATED DIFFERENTIAL     Segs Relative 69.0 (H) 36 - 66 %    Lymphocytes % 22.5 (L) 24 - 44 %    Monocytes % 6.2 (H) 0 - 4 %    Eosinophils % 1.9 0 - 3 %    Basophils % 0.1 0 - 1 %    Segs Absolute 5.9 K/CU MM    Lymphocytes # 1.9 K/CU MM    Monocytes # 0.5 K/CU MM    Eosinophils # 0.2 K/CU MM    Basophils # K/CU MM    Nucleated RBC % 0.0 %    Total Nucleated RBC 0.0 K/CU MM    Total Immature Neutrophil 0.04 K/CU MM    Immature Neutrophil % 0.5 (H) 0 - 0.43 %   Lactic Acid, Plasma   Result Value Ref Range    Lactate 0.5 0.4 - 2.0 mMOL/L   POCT Glucose   Result Value Ref Range    POC Glucose 97 70 - 99 MG/DL   POCT Glucose   Result Value Ref Range    POC Glucose 115 (H) 70 - 99 MG/DL     IMAGING:  Ct Facial Bones W Contrast    Result Date: 4/26/2019  EXAMINATION: CT OF THE FACE WITH CONTRAST 4/26/2019 TECHNIQUE: CT of the face was performed with the administration of intravenous contrast. Multiplanar reformatted images are provided for review. Dose modulation, iterative reconstruction, and/or weight based adjustment of the mA/kV was utilized to reduce the radiation dose to as low as reasonably achievable. COMPARISON: None. HISTORY: ORDERING SYSTEM PROVIDED HISTORY: soft tissue infection left face, likely shingles TECHNOLOGIST PROVIDED HISTORY: Ordering Physician Provided Reason for Exam: soft tissue infection left face, likely shingles Acuity: Acute Type of Exam: Initial Additional signs and symptoms: no Relevant Medical/Surgical History: 75 ml isovue 370 used FINDINGS: There is abnormal soft tissue stranding overlying the left orbit and extending along the left cheek. There is no postseptal extension evident. There is no focal fluid collection identified to suggest an abscess. The nasopharynx and oropharynx are normal in appearance. The epiglottis is normal in appearance. There is mild mucosal thickening within the left sphenoid sinus. The paranasal sinuses and mastoid air cells are otherwise clear. Findings consistent with a left preseptal cellulitis and left facial cellulitis. No abscess identified. ED COURSE & MEDICAL DECISION MAKING    Patient presents as above. Emergent etiologies considered.   Patient sent from [facility] for concern of a shingles rash into the V1 distribution involving the left eye. Patient has been on Valtrex but symptoms continued to worsen. No obvious Cheema sign. On reevaluation no obvious orbital involvement. We attempted to stay in the eye and look for any dendritic lesions but patient did not tolerate this well. Patient was initiated on acyclovir IV while in the ED, we also initiated empiric antibiotic therapy for superimposed cellulitic infection. Labs are reassuring, CT scans are showing findings consistent with a left preseptal cellulitis and left facial cellulitis. We did consult ophthalmology, and they were in agreement with our plan. Dr. Maritza Ceja spoke with the hospitalist to rule except admission for long-term management    Vital signs and nursing notes reviewed during ED course. Patient care and presentation staffed with supervising MD.  All pertinent Lab data and radiographic results reviewed with patient at bedside. The patient and/or the family were informed of the results of any tests/labs/imaging, the treatment plan, and time was allotted to answer questions. Clinical  IMPRESSION    1. Herpes zoster with ophthalmic complication, unspecified herpes zoster eye disease    2. Cellulitis of face      Comment: Please note this report has been produced using speech recognition software and may contain errors related to that system including errors in grammar, punctuation, and spelling, as well as words and phrases that may be inappropriate. If there are any questions or concerns please feel free to contact the dictating provider for clarification.      [address], [place]  04/27/19 9728

## 2019-04-27 NOTE — PROGRESS NOTES
Progress Note (Hospitalist, Internal Medicine)  IDENTIFYING INFORMATION   PATIENT:  Juanito Carmona  MRN:  [id number]  ADMIT DATE: 4/26/2019  TIME OF EVALUATION: 4/27/2019 9:03 AM      HISTORY OF PRESENT ILLNESS   Juanito Carmona is a 52 y.o. female with a past medical history of *CP, wheelchair bound, chronic pain, depression, DMII who resides at [facility] who presents with 3-4 days hx of progressive left eye blindness and local pain along the left forehead/ron-orbital region with mixed of crusty lesion along with spreading erythema and swelling. Symptoms has progressed to the point the her left eye is swollen shut. Was dx at nursing home yesterday and started on valtrex but did not improve and further developed ron-orbital skin infection. SUBJECTIVE     Having left facial pain from shingles    MEDICATIONS   Medications Prior to Admission  Medications Prior to Admission: vitamin D (CHOLECALCIFEROL) 5000 units CAPS capsule, Take 5,000 Units by mouth daily  docusate sodium (COLACE) 100 MG capsule, Take 100 mg by mouth 2 times daily  cephALEXin (KEFLEX) 250 MG capsule, Take 250 mg by mouth 4 times daily  oxybutynin (DITROPAN) 5 MG tablet, Take 5 mg by mouth 3 times daily  promethazine (PHENERGAN) 25 MG tablet, Take 25 mg by mouth every 6 hours as needed for Nausea  torsemide (DEMADEX) 20 MG tablet, Take 1 tablet by mouth every other day  gabapentin (NEURONTIN) 300 MG capsule, Take 300 mg by mouth 2 times daily  acetaminophen (TYLENOL) 325 MG tablet, Take 650 mg by mouth every 4 hours as needed for Pain  loperamide (IMODIUM) 2 MG capsule, Take 2 mg by mouth as needed for Diarrhea  HYDROcodone-acetaminophen (NORCO) 5-325 MG per tablet, Take 1 tablet by mouth 2 times daily as needed for Pain .  (Patient taking differently: Take 1 tablet by mouth every 12 hours  .)  butalbital-acetaminophen-caffeine (FIORICET, ESGIC) -40 MG per tablet, Take 1 tablet by mouth every 6 hours as needed for Headaches or Migraine  metFORMIN (GLUCOPHAGE) 500 MG tablet, Take 1 tablet by mouth daily (with breakfast)  levETIRAcetam (KEPPRA) 1000 MG tablet, Take 1 tablet by mouth 2 times daily (Patient taking differently: Take 1,500 mg by mouth 2 times daily )  sertraline (ZOLOFT) 100 MG tablet, Take 75 mg by mouth daily  baclofen (LIORESAL) 10 MG tablet, Take 10 mg by mouth 2 times daily  calcium-vitamin D (OSCAL 500/200 D-3) 500-200 MG-UNIT per tablet, Take 1 tablet by mouth 3 times daily  cilostazol (PLETAL) 100 MG tablet, Take 100 mg by mouth 2 times daily  famotidine (PEPCID) 10 MG tablet, Take 10 mg by mouth 2 times daily. ondansetron (ZOFRAN-ODT) 4 MG disintegrating tablet, Take 4 mg by mouth every 8 hours as needed for Nausea or Vomiting.   cholestyramine (QUESTRAN) 4 G packet, Take 1 packet by mouth every 12 hours as needed (diarrhea)  metoprolol (LOPRESSOR) 100 MG tablet, Take 1 tablet by mouth 2 times daily  atorvastatin (LIPITOR) 20 MG tablet, Take 20 mg by mouth nightly   zonisamide (ZONEGRAN) 100 MG capsule, Take 400 mg by mouth nightly Indications: 3 caps po daily     Current Medications  Current Facility-Administered Medications   Medication Dose Route Frequency Provider Last Rate Last Dose    metoprolol tartrate (LOPRESSOR) tablet 25 mg  25 mg Oral BID Marzena Bautista MD   25 mg at 04/27/19 0851    nortriptyline (PAMELOR) capsule 50 mg  50 mg Oral Nightly Marzena Bautista MD        morphine (MSIR) tablet 15 mg  15 mg Oral Q4H PRN Marzena Bautista MD        sodium chloride flush 0.9 % injection 10 mL  10 mL Intravenous BID Marzena Bautista MD        acyclovir (ZOVIRAX) 500 mg in dextrose 5 % 100 mL IVPB  500 mg Intravenous Q8H Marzena Bautista MD   Stopped at 04/27/19 0305    0.9 % sodium chloride infusion   Intravenous Continuous Marzena Bautista MD 60 mL/hr at 04/26/19 2337      vancomycin (VANCOCIN) 1,250 mg in dextrose 5 % 250 mL IVPB  1,250 mg Intravenous Q12H Marzena Bautista  mL/hr at 04/27/19 0855 1,250 mg at 04/27/19 0855    enoxaparin (LOVENOX) injection 40 mg  40 mg Subcutaneous Daily Darin Nicole MD   40 mg at 04/27/19 0855    bacitracin ophthalmic ointment   Left Eye 6 times per day Darin Nicole MD        polyvinyl alcohol (LIQUIFILM TEARS) 1.4 % ophthalmic solution 1 drop  1 drop Left Eye Daily Darin Nicole MD   1 drop at 04/27/19 0855    acetaminophen (TYLENOL) tablet 650 mg  650 mg Oral Q4H PRN Darin Nicole MD   650 mg at 04/26/19 2321    atorvastatin (LIPITOR) tablet 20 mg  20 mg Oral Nightly Darin Nicole MD   20 mg at 04/26/19 2321    baclofen (LIORESAL) tablet 10 mg  10 mg Oral BID Darin Nicole MD   10 mg at 04/27/19 0851    butalbital-acetaminophen-caffeine (FIORICET, ESGIC) per tablet 1 tablet  1 tablet Oral Q6H PRN Darin Nicole MD   1 tablet at 04/27/19 0035    calcium-vitamin D (OSCAL-500) 500-200 MG-UNIT per tablet 1 tablet  1 tablet Oral TID Darin Nicole MD   1 tablet at 04/27/19 0851    cholestyramine light packet 4 g  4 g Oral Q12H PRN Darin Nicole MD        cilostazol (PLETAL) tablet 100 mg  100 mg Oral BID Darin Nicole MD   100 mg at 04/27/19 0851    docusate sodium (COLACE) capsule 100 mg  100 mg Oral BID Darin Nicole MD   100 mg at 04/27/19 0850    famotidine (PEPCID) tablet 10 mg  10 mg Oral BID Darin Nicole MD   10 mg at 04/27/19 0851    gabapentin (NEURONTIN) capsule 300 mg  300 mg Oral BID Darin Nicole MD   300 mg at 04/27/19 0851    HYDROcodone-acetaminophen (NORCO) 5-325 MG per tablet 1 tablet  1 tablet Oral BID Darin Nicole MD   1 tablet at 04/27/19 0851    levETIRAcetam (KEPPRA) tablet 1,500 mg  1,500 mg Oral BID Darin Nicole MD   1,500 mg at 04/27/19 0850    ondansetron (ZOFRAN-ODT) disintegrating tablet 4 mg  4 mg Oral Q8H PRN Darin Nicole MD        oxybutynin (DITROPAN) tablet 5 mg  5 mg Oral TID Darin Nicole MD   5 mg at 04/27/19 0851    promethazine (PHENERGAN) tablet 25 mg  25 mg Oral Q6H PRN Darin Nicole MD        zonisamide (ZONEGRAN) capsule 400 mg  400 mg Oral Nightly Gianfranco Fountain MD   400 mg at 04/26/19 2323    vitamin D (CHOLECALCIFEROL) tablet 5,000 Units  5,000 Units Oral Daily Gianfranco Fountain MD   5,000 Units at 04/27/19 0850    torsemide (DEMADEX) tablet 20 mg  20 mg Oral Every Other Day Gianfranco Fountain MD   20 mg at 04/27/19 0850    sertraline (ZOLOFT) tablet 75 mg  75 mg Oral Daily Gianfranco Fountain MD   75 mg at 04/27/19 0849    insulin lispro (HUMALOG) injection vial 0-6 Units  0-6 Units Subcutaneous TID WC Gianfranco Fountain MD        insulin lispro (HUMALOG) injection vial 0-3 Units  0-3 Units Subcutaneous Nightly Gianfranco Fountain MD        glucose (GLUTOSE) 40 % oral gel 15 g  15 g Oral PRN Gianfranco Fountain MD        dextrose 50 % solution 12.5 g  12.5 g Intravenous PRN Gianfranco Fountain MD        glucagon (rDNA) injection 1 mg  1 mg Intramuscular PRN Gianfranco Fountain MD        dextrose 5 % solution  100 mL/hr Intravenous PRN Gianfranco Fountain MD             Allergies  Allergies   Allergen Reactions    Ciprofloxacin Hives    Contrast [Iodides] Itching    Depacon [Valproic Acid] Swelling    Sulfa Antibiotics Itching       REVIEW OF SYSTEMS     Within above limitations. 14 point review of systems reviewed. Pertinent positive or negative as per HPI or otherwise negative per 14 point systems review. Reviewed 4/27/2019 at 9:03 AM    PHYSICAL EXAM       Blood pressure 99/66, pulse 89, temperature 98.6 °F (37 °C), temperature source Oral, resp. rate 20, height 5' (1.524 m), weight 170 lb (77.1 kg), SpO2 97 %, not currently breastfeeding. General - AAO x 3  Psych - Appropriate affect/speech. No agitation  Eyes - Eye lids intact. No scleral icterus  ENT - Left eye shut, unable to open. Lips wnl. External ear clear/dry/intact. No thyromegaly on inspection  Neuro - No gross peripheral or central neuro deficits on inspection  Heart - Sinus. RRR. S1 and S2 present. No elevated JVD appreciated  Lung - Adequate air entry b/l, No crackles/wheezes appreciated  GI - Soft. No guarding/rigidity. BS+   - No CVA/suprapubic tenderness or palpable bladder distension  Skin - left facial rash wit forehead crusty lesion. Pain along the left face from shingles  MSK - muscle wasting b/l LE due to CP       LABS AND IMAGING   CBC  [unfilled]    Last 3 Hemoglobin  Lab Results   Component Value Date    HGB 9.3 04/26/2019    HGB 9.6 04/02/2019    HGB 9.0 03/28/2019     Last 3 WBC/ANC  Lab Results   Component Value Date    WBC 8.6 04/26/2019    WBC 8.4 04/02/2019    WBC 6.5 03/28/2019     No components found for: GRNLOCTYABS  Last 3 Platelets  No results found for: PLATELET  Chemistry  [unfilled]  [unfilled]  No results found for: LDH  Coagulation Studies  Lab Results   Component Value Date    INR 0.87 11/19/2014     Liver Function Studies  Lab Results   Component Value Date    ALT 11 04/26/2019    AST 11 04/26/2019    ALKPHOS 175 04/26/2019       Recent Imaging    Jose Gallegos #[id number] ([id number]) (52 y.o. F) (Adm: 04/26/19)    Cherri Glaser [id number]         Imaging Results (last 7 days)          Procedure Component Value Ref Range Date/Time     CT FACIAL BONES W CONTRAST [[id number]] Collected: 04/26/19 2003     Order Status: Completed Updated: 04/26/19 2010     Narrative:       EXAMINATION:  CT OF THE FACE WITH CONTRAST 4/26/2019    TECHNIQUE:  CT of the face was performed with the administration of intravenous contrast.  Multiplanar reformatted images are provided for review. Dose modulation,  iterative reconstruction, and/or weight based adjustment of the mA/kV was  utilized to reduce the radiation dose to as low as reasonably achievable. COMPARISON:  None.     HISTORY:  ORDERING SYSTEM PROVIDED HISTORY: soft tissue infection left face, likely  shingles  TECHNOLOGIST PROVIDED HISTORY:  Ordering Physician Provided Reason for Exam: soft tissue infection left face,  likely shingles  Acuity: Acute  Type of Exam: Initial  Additional signs and symptoms: no  Relevant Medical/Surgical History: 75 ml isovue 370 used    FINDINGS:  There is abnormal soft tissue stranding overlying the left orbit and  extending along the left cheek.  There is no postseptal extension evident. There is no focal fluid collection identified to suggest an abscess. The nasopharynx and oropharynx are normal in appearance.  The epiglottis is  normal in appearance.  There is mild mucosal thickening within the left  sphenoid sinus.  The paranasal sinuses and mastoid air cells are otherwise  clear.     Impression:       Findings consistent with a left preseptal cellulitis and left facial  cellulitis.     No abscess identified.             Relevant labs and imaging reviewed    ASSESSMENT AND PLAN         Pre-septal cellulitis  Herpes zoster left facial infectin  Herpes ophthalmic eye infection with blindness  - ED d/w ophthalmology to assist  - IV vanco, trend level, pharmacy to assist  - IV acyclovir  - IVF ppx while on acyclovir  - eye drops  - continue to follow clinical course, requiring intensive IV antibiotics     Seizure     DMII   - hold oral med  - ISS for now     Acute facial pain  - continue neurontin  - add pamelor  - add MSIR 15 prn for acute pain      Chronic pain in b/l LE from CP  Cerebral palsy     Wheelchair bound - functional quadriplegia     Lives in [address] [place] [address]      Lovenox ppx          [address] [facility], Internal Medicine  4/27/2019 at 9:03 AM

## 2019-04-28 NOTE — PROGRESS NOTES
Pt seems more lethargic than usual and has been sleeping all day. Notified Dr. Rahel Schulz and will continue to monitor. Pt is able to identify year, date, and her birthday and does not appear confused, just sleepy.

## 2019-04-28 NOTE — PROGRESS NOTES
Progress Note (Hospitalist, Internal Medicine)  IDENTIFYING INFORMATION   PATIENT:  Mirtha Reyngaa  MRN:  [id number]  ADMIT DATE: 4/26/2019  TIME OF EVALUATION: 4/28/2019 9:13 AM      HISTORY OF PRESENT ILLNESS   Mirtha Reynaga is a 52 y.o. female with a past medical history of *CP, wheelchair bound, chronic pain, depression, DMII who resides at [facility] who presents with 3-4 days hx of progressive left eye blindness and local pain along the left forehead/ron-orbital region with mixed of crusty lesion along with spreading erythema and swelling. Symptoms has progressed to the point the her left eye is swollen shut. Was dx at nursing home yesterday and started on valtrex but did not improve and further developed ron-orbital skin infection. SUBJECTIVE   Facial pain unchanged. Some improvement with breakthrough pain medicines. Doing fair otherwise. MEDICATIONS   Medications Prior to Admission  Medications Prior to Admission: vitamin D (CHOLECALCIFEROL) 5000 units CAPS capsule, Take 5,000 Units by mouth daily  docusate sodium (COLACE) 100 MG capsule, Take 100 mg by mouth 2 times daily  cephALEXin (KEFLEX) 250 MG capsule, Take 250 mg by mouth 4 times daily  oxybutynin (DITROPAN) 5 MG tablet, Take 5 mg by mouth 3 times daily  promethazine (PHENERGAN) 25 MG tablet, Take 25 mg by mouth every 6 hours as needed for Nausea  torsemide (DEMADEX) 20 MG tablet, Take 1 tablet by mouth every other day  gabapentin (NEURONTIN) 300 MG capsule, Take 300 mg by mouth 2 times daily  acetaminophen (TYLENOL) 325 MG tablet, Take 650 mg by mouth every 4 hours as needed for Pain  loperamide (IMODIUM) 2 MG capsule, Take 2 mg by mouth as needed for Diarrhea  HYDROcodone-acetaminophen (NORCO) 5-325 MG per tablet, Take 1 tablet by mouth 2 times daily as needed for Pain .  (Patient taking differently: Take 1 tablet by mouth every 12 hours  .)  butalbital-acetaminophen-caffeine (FIORICET, ESGIC) -40 MG per tablet, Take 1 tablet by mouth every MD   40 mg at 04/27/19 0855    bacitracin ophthalmic ointment   Left Eye 6 times per day Jamila Drake MD        polyvinyl alcohol (LIQUIFILM TEARS) 1.4 % ophthalmic solution 1 drop  1 drop Left Eye Daily Jamila Drake MD   1 drop at 04/27/19 0855    acetaminophen (TYLENOL) tablet 650 mg  650 mg Oral Q4H PRN Jamila Drake MD   650 mg at 04/26/19 2321    atorvastatin (LIPITOR) tablet 20 mg  20 mg Oral Nightly Jamila Drake MD   20 mg at 04/27/19 2033    baclofen (LIORESAL) tablet 10 mg  10 mg Oral BID Jamila Drake MD   10 mg at 04/27/19 2033    butalbital-acetaminophen-caffeine (FIORICET, ESGIC) per tablet 1 tablet  1 tablet Oral Q6H PRN Jamlia Drake MD   1 tablet at 04/27/19 0035    calcium-vitamin D (OSCAL-500) 500-200 MG-UNIT per tablet 1 tablet  1 tablet Oral TID Jamila Drake MD   1 tablet at 04/27/19 2032    cholestyramine light packet 4 g  4 g Oral Q12H PRN Jamila Drake MD        cilostazol (PLETAL) tablet 100 mg  100 mg Oral BID Jamila Drake MD   100 mg at 04/27/19 2033    docusate sodium (COLACE) capsule 100 mg  100 mg Oral BID Jamila Drake MD   100 mg at 04/27/19 2033    famotidine (PEPCID) tablet 10 mg  10 mg Oral BID Jamila Drake MD   10 mg at 04/27/19 2032    gabapentin (NEURONTIN) capsule 300 mg  300 mg Oral BID Jamila Drake MD   300 mg at 04/27/19 2033    HYDROcodone-acetaminophen (NORCO) 5-325 MG per tablet 1 tablet  1 tablet Oral BID Jamila Drake MD   1 tablet at 04/27/19 2033    levETIRAcetam (KEPPRA) tablet 1,500 mg  1,500 mg Oral BID Jamila Drake MD   1,500 mg at 04/27/19 2033    ondansetron (ZOFRAN-ODT) disintegrating tablet 4 mg  4 mg Oral Q8H PRN Jamila Drake MD   4 mg at 04/27/19 1711    oxybutynin (DITROPAN) tablet 5 mg  5 mg Oral TID Jamila Drake MD   5 mg at 04/27/19 2033    promethazine (PHENERGAN) tablet 25 mg  25 mg Oral Q6H PRN Jamila Drake MD   25 mg at 04/27/19 2241    zonisamide (ZONEGRAN) capsule 400 mg  400 mg Oral Nightly Jamila Drake MD   400 mg at 04/27/19 2033    vitamin D (CHOLECALCIFEROL) tablet 5,000 Units  5,000 Units Oral Daily Raquel Day MD   5,000 Units at 04/27/19 0850    torsemide (DEMADEX) tablet 20 mg  20 mg Oral Every Other Day Raquel Day MD   20 mg at 04/27/19 0850    sertraline (ZOLOFT) tablet 75 mg  75 mg Oral Daily Raquel Day MD   75 mg at 04/27/19 0849    insulin lispro (HUMALOG) injection vial 0-6 Units  0-6 Units Subcutaneous TID WC Raquel Day MD   Stopped at 04/28/19 1759    insulin lispro (HUMALOG) injection vial 0-3 Units  0-3 Units Subcutaneous Nightly Raquel Day MD        glucose (GLUTOSE) 40 % oral gel 15 g  15 g Oral PRN Raquel Day MD        dextrose 50 % solution 12.5 g  12.5 g Intravenous PRN Raquel Day MD        glucagon (rDNA) injection 1 mg  1 mg Intramuscular PRN Raquel Day MD        dextrose 5 % solution  100 mL/hr Intravenous PRN Raquel Day MD             Allergies  Allergies   Allergen Reactions    Ciprofloxacin Hives    Contrast [Iodides] Itching    Depacon [Valproic Acid] Swelling    Sulfa Antibiotics Itching       REVIEW OF SYSTEMS     Within above limitations. 14 point review of systems reviewed. Pertinent positive or negative as per HPI or otherwise negative per 14 point systems review. Reviewed 4/28/2019 at 9:13 AM    PHYSICAL EXAM       Blood pressure 114/62, pulse 118, temperature 98.2 °F (36.8 °C), temperature source Oral, resp. rate 18, height 5' (1.524 m), weight 170 lb (77.1 kg), SpO2 91 %, not currently breastfeeding. General - AAO x 3  Psych - Appropriate affect/speech. No agitation  Eyes - Eye lids intact. No scleral icterus  ENT - Left eye shut, unable to open. Lips wnl. External ear clear/dry/intact. No thyromegaly on inspection  Neuro - No gross peripheral or central neuro deficits on inspection  Heart - Sinus. RRR. S1 and S2 present. No elevated JVD appreciated  Lung - Adequate air entry b/l, No crackles/wheezes appreciated  GI - Soft. No guarding/rigidity. BS+   - No CVA/suprapubic tenderness or palpable bladder distension  Skin - left facial rash wit forehead crusty lesion. Pain along the left face from shingles  MSK - muscle wasting b/l LE due to CP       LABS AND IMAGING   CBC  [unfilled]    Last 3 Hemoglobin  Lab Results   Component Value Date    HGB 8.3 04/27/2019    HGB 9.3 04/26/2019    HGB 9.6 04/02/2019     Last 3 WBC/ANC  Lab Results   Component Value Date    WBC 8.3 04/27/2019    WBC 8.6 04/26/2019    WBC 8.4 04/02/2019     No components found for: GRNLOCTYABS  Last 3 Platelets  No results found for: PLATELET  Chemistry  [unfilled]  [unfilled]  No results found for: LDH  Coagulation Studies  Lab Results   Component Value Date    INR 0.87 11/19/2014     Liver Function Studies  Lab Results   Component Value Date    ALT 11 04/26/2019    AST 11 04/26/2019    ALKPHOS 175 04/26/2019       Recent Imaging    El Sweet #[id number] ([id number]) (52 y.o. F) (Adm: 04/26/19)    [address] [place] [address] [id number]         Imaging Results (last 7 days)          Procedure Component Value Ref Range Date/Time     CT FACIAL BONES W CONTRAST [[id number]] Collected: 04/26/19 2003     Order Status: Completed Updated: 04/26/19 2010     Narrative:       EXAMINATION:  CT OF THE FACE WITH CONTRAST 4/26/2019    TECHNIQUE:  CT of the face was performed with the administration of intravenous contrast.  Multiplanar reformatted images are provided for review. Dose modulation,  iterative reconstruction, and/or weight based adjustment of the mA/kV was  utilized to reduce the radiation dose to as low as reasonably achievable. COMPARISON:  None.     HISTORY:  ORDERING SYSTEM PROVIDED HISTORY: soft tissue infection left face, likely  shingles  TECHNOLOGIST PROVIDED HISTORY:  Ordering Physician Provided Reason for Exam: soft tissue infection left face,  likely shingles  Acuity: Acute  Type of Exam: Initial  Additional signs and symptoms: no  Relevant Medical/Surgical History: 75 ml isovue 370 used    FINDINGS:  There is abnormal soft tissue stranding overlying the left orbit and  extending along the left cheek.  There is no postseptal extension evident. There is no focal fluid collection identified to suggest an abscess. The nasopharynx and oropharynx are normal in appearance.  The epiglottis is  normal in appearance.  There is mild mucosal thickening within the left  sphenoid sinus.  The paranasal sinuses and mastoid air cells are otherwise  clear.     Impression:       Findings consistent with a left preseptal cellulitis and left facial  cellulitis. No abscess identified.             Relevant labs and imaging reviewed    ASSESSMENT AND PLAN         Pre-septal cellulitis  Herpes zoster left facial infectin  Herpes ophthalmic eye infection with blindness  - ED d/w ophthalmology to assist  - IV vanco, trend level, pharmacy to assist  Levels this > 20 and is high.  Will hold dose and check random vanc with a.m labs   - IV acyclovir  - IVF ppx while on acyclovir  - eye drops  - improving but slow, continue to follow clinical course, requiring intensive IV antibiotics     Seizure     DMII   - hold oral med  - ISS for now     Acute facial pain  - continue neurontin  - add pamelor  - add MSIR 15 prn for acute pain      Chronic pain in b/l LE from CP  Cerebral palsy     Wheelchair bound - functional quadriplegia     Lives in [address] [facility] ppx          67 [facility], Internal Medicine  4/28/2019 at 9:13 AM

## 2019-04-29 NOTE — DISCHARGE INSTR - COC
Acinetobacter multidrug resistant No  08/09/17 Myrna Early RN 03/27/19 Sharath Oconnor, QUYNHN     3/80/87 urine    ESBL (Extended Spectrum Beta Lactamase) No  07/10/17 Sharath Oconnor, QUYNHN 11/81/96 Napolecolin Oconnor, QUYNHN     7/1/31 Klebsiella pneumoniae urine    MRSA No  08/29/16 Laura Singletary RN 03/27/19 Sharath Oconnor, DEVANG     5/2/13 nares; 3/18/16, delgado          Nurse Assessment:  Last Vital Signs: /66   Pulse 88   Temp 97.6 °F (36.4 °C) (Oral)   Resp 15   Ht 5' (1.524 m)   Wt 170 lb (77.1 kg)   SpO2 98%   BMI 33.20 kg/m²     Last documented pain score (0-10 scale): Pain Level: 9  Last Weight:   Wt Readings from Last 1 Encounters:   04/27/19 170 lb (77.1 kg)     Mental Status:  {IP PT MENTAL STATUS:82634}    IV Access:  { ERIC IV ACCESS:138333314}    Nursing Mobility/ADLs:  Walking   {P DME WBNY:075705605}  Transfer  {P DME AKST:133574472}  Bathing  {P DME EIFY:687604135}  Dressing  {CHP DME HABQ:268559568}  Toileting  {CHP DME TPKQ:875688735}  Feeding  {P DME AYBS:760808899}  Med Admin  {[facility] DME UTYV:959034246}  Med Delivery   { ERIC MED Delivery:386752905}    Wound Care Documentation and Therapy:        Elimination:  Continence:   · Bowel: {YES / KJ:65541}  · Bladder: {YES / LZ:58913}  Urinary Catheter: {Urinary Catheter:740772001}   Colostomy/Ileostomy/Ileal Conduit: {YES / FV:42506}       Date of Last BM: ***    Intake/Output Summary (Last 24 hours) at 4/29/2019 1137  Last data filed at 4/29/2019 0516  Gross per 24 hour   Intake --   Output 560 ml   Net -560 ml     I/O last 3 completed shifts:  In: -   Out: 560 [Urine:560]    Safety Concerns:     [address] ERIC Safety Concerns:360073989}    Impairments/Disabilities:      [address] [facility] Impairments/Disabilities:174717700}        Patient's personal belongings (please select all that are sent with patient):  {NATALI DME Belongings:178393952}    RN SIGNATURE:  {Esignature:695749837}    CASE MANAGEMENT/SOCIAL WORK SECTION    Inpatient Status Date: ***    Readmission Risk Assessment Score:  Readmission Risk              Risk of Unplanned Readmission:        18           Discharging to Facility/ Agency   · Name:   · Address:  · Phone:  · Fax:    Dialysis Facility (if applicable)   · Name:  · Address:  · Dialysis Schedule:  · Phone:  · Fax:    / signature: {Emmaature:175844613}    PHYSICIAN SECTION    Prognosis: Fair    Condition at Discharge: Stable    Rehab Potential (if transferring to Rehab): Fair    Recommended Labs or Other Treatments After Discharge: Follow up with eye doctor Dr Karuna Mann within 1 week for post hospitalization check  Complete Clindamycin, augmentin, acyclovir for pre-septal cellulitis and shingles with herpetic eye infection  Wound care for bed sore ppx    Physician Certification: I certify the above information and transfer of Deb Wilkes  is necessary for the continuing treatment of the diagnosis listed and that she requires [facility] for greater 30 days. Update Admission H&P: No change in H&P  Nutrition Therapy:  Current Nutrition Therapy:   - Oral Diet:  Diabetic carb control    Routes of Feeding: Oral  Liquids: No Restrictions  Daily Fluid Restriction: no  Last Modified Barium Swallow with Video (Video Swallowing Test): not done    Treatments at the Time of Hospital Discharge:   Respiratory Treatments: aggressive incentive spirometry 10 x every hour  Oxygen Therapy:  is not on home oxygen therapy.   Ventilator:    - No ventilator support    Rehab Therapies: Physical Therapy and Occupational Therapy  Weight Bearing Status/Restrictionsshe reports that she is wheelchair bound due to CP  Other Medical Equipment (for information only, NOT a DME order):  wheelchair  Other Treatments:   PHYSICIAN SIGNATURE:  Electronically signed by Kristie Tapia MD on 4/30/19 at 8:34 AM

## 2019-04-29 NOTE — PLAN OF CARE
Problem: Pain:  Goal: Pain level will decrease  Description  Pain level will decrease  4/29/2019 0010 by Jeremie Velasquez RN  Outcome: Ongoing  4/28/2019 1409 by Ladarius Salazar RN  Outcome: Met This Shift  Goal: Control of acute pain  Description  Control of acute pain  4/29/2019 0010 by Jeremie Velasquez RN  Outcome: Ongoing  4/28/2019 1409 by Ladarius Salazar RN  Outcome: Met This Shift  Goal: Control of chronic pain  Description  Control of chronic pain  4/29/2019 0010 by Jeremie Velasquez RN  Outcome: Ongoing  4/28/2019 1409 by Ladarius Salazar RN  Outcome: Met This Shift     Problem: Risk for Impaired Skin Integrity  Goal: Tissue integrity - skin and mucous membranes  Description  Structural intactness and normal physiological function of skin and  mucous membranes.   4/29/2019 0010 by Jeremie Velasquez RN  Outcome: Ongoing  4/28/2019 1409 by Ladarius Salazar RN  Outcome: Ongoing     Problem: Falls - Risk of:  Goal: Will remain free from falls  Description  Will remain free from falls  4/29/2019 0010 by Jeremie Velasquez RN  Outcome: Ongoing  4/28/2019 1409 by Ladarius Salazar RN  Outcome: Met This Shift  Goal: Absence of physical injury  Description  Absence of physical injury  4/29/2019 0010 by Jeremie Velasquez RN  Outcome: Ongoing  4/28/2019 1409 by Ladarius Salazar RN  Outcome: Met This Shift

## 2019-04-29 NOTE — PROGRESS NOTES
Progress Note (Hospitalist, Internal Medicine)  IDENTIFYING INFORMATION   PATIENT:  Silvia Guajardo  MRN:  [id number]  ADMIT DATE: 4/26/2019  TIME OF EVALUATION: 4/29/2019 8:34 AM      HISTORY OF PRESENT ILLNESS   Silvia Guajardo is a 52 y.o. female with a past medical history of *CP, wheelchair bound, chronic pain, depression, DMII who resides at [facility] who presents with 3-4 days hx of progressive left eye blindness and local pain along the left forehead/ron-orbital region with mixed of crusty lesion along with spreading erythema and swelling. Symptoms has progressed to the point the her left eye is swollen shut. Was dx at nursing home yesterday and started on valtrex but did not improve and further developed ron-orbital skin infection. SUBJECTIVE     Facial pain better with cooling towel  Erythema , swelling improved  Had some increase urine in bladder when she was asleep in a.m    MEDICATIONS   Medications Prior to Admission  Medications Prior to Admission: vitamin D (CHOLECALCIFEROL) 5000 units CAPS capsule, Take 5,000 Units by mouth daily  docusate sodium (COLACE) 100 MG capsule, Take 100 mg by mouth 2 times daily  cephALEXin (KEFLEX) 250 MG capsule, Take 250 mg by mouth 4 times daily  oxybutynin (DITROPAN) 5 MG tablet, Take 5 mg by mouth 3 times daily  promethazine (PHENERGAN) 25 MG tablet, Take 25 mg by mouth every 6 hours as needed for Nausea  torsemide (DEMADEX) 20 MG tablet, Take 1 tablet by mouth every other day  gabapentin (NEURONTIN) 300 MG capsule, Take 300 mg by mouth 2 times daily  acetaminophen (TYLENOL) 325 MG tablet, Take 650 mg by mouth every 4 hours as needed for Pain  loperamide (IMODIUM) 2 MG capsule, Take 2 mg by mouth as needed for Diarrhea  HYDROcodone-acetaminophen (NORCO) 5-325 MG per tablet, Take 1 tablet by mouth 2 times daily as needed for Pain .  (Patient taking differently: Take 1 tablet by mouth every 12 hours  .)  butalbital-acetaminophen-caffeine (FIORICET, ESGIC) -40 MG per tablet, Take 1 tablet by mouth every 6 hours as needed for Headaches or Migraine  metFORMIN (GLUCOPHAGE) 500 MG tablet, Take 1 tablet by mouth daily (with breakfast)  levETIRAcetam (KEPPRA) 1000 MG tablet, Take 1 tablet by mouth 2 times daily (Patient taking differently: Take 1,500 mg by mouth 2 times daily )  sertraline (ZOLOFT) 100 MG tablet, Take 75 mg by mouth daily  baclofen (LIORESAL) 10 MG tablet, Take 10 mg by mouth 2 times daily  calcium-vitamin D (OSCAL 500/200 D-3) 500-200 MG-UNIT per tablet, Take 1 tablet by mouth 3 times daily  cilostazol (PLETAL) 100 MG tablet, Take 100 mg by mouth 2 times daily  famotidine (PEPCID) 10 MG tablet, Take 10 mg by mouth 2 times daily. ondansetron (ZOFRAN-ODT) 4 MG disintegrating tablet, Take 4 mg by mouth every 8 hours as needed for Nausea or Vomiting.   cholestyramine (QUESTRAN) 4 G packet, Take 1 packet by mouth every 12 hours as needed (diarrhea)  metoprolol (LOPRESSOR) 100 MG tablet, Take 1 tablet by mouth 2 times daily  atorvastatin (LIPITOR) 20 MG tablet, Take 20 mg by mouth nightly   zonisamide (ZONEGRAN) 100 MG capsule, Take 400 mg by mouth nightly Indications: 3 caps po daily     Current Medications  Current Facility-Administered Medications   Medication Dose Route Frequency Provider Last Rate Last Dose    vancomycin (VANCOCIN) 750 mg in dextrose 5 % 250 mL IVPB  10 mg/kg Intravenous Q12H Raquel Day MD        acyclovir (ZOVIRAX) 500 mg in dextrose 5 % 100 mL IVPB  500 mg Intravenous Q8H Raquel Day MD        midodrine (PROAMATINE) tablet 10 mg  10 mg Oral TID WC Raquel Day MD   10 mg at 04/28/19 1658    nortriptyline (PAMELOR) capsule 50 mg  50 mg Oral Nightly Raquel Day MD   50 mg at 04/28/19 2114    morphine (MSIR) tablet 15 mg  15 mg Oral Q4H PRN Raquel Day MD   15 mg at 04/29/19 0307    sodium chloride flush 0.9 % injection 10 mL  10 mL Intravenous BID Raquel Day MD   10 mL at 04/28/19 2113    0.9 % sodium chloride infusion mg Oral Q6H PRN Lucas Horvath MD   25 mg at 04/27/19 2241    zonisamide (ZONEGRAN) capsule 400 mg  400 mg Oral Nightly Lucas Horvath MD   400 mg at 04/28/19 2114    vitamin D (CHOLECALCIFEROL) tablet 5,000 Units  5,000 Units Oral Daily Lucas Horvath MD   5,000 Units at 04/28/19 0913    torsemide (DEMADEX) tablet 20 mg  20 mg Oral Every Other Day Lucas Horvath MD   20 mg at 04/27/19 0850    sertraline (ZOLOFT) tablet 75 mg  75 mg Oral Daily Lucas Horvath MD   75 mg at 04/28/19 0914    insulin lispro (HUMALOG) injection vial 0-6 Units  0-6 Units Subcutaneous TID WC Lucas Horvath MD   Stopped at 04/28/19 2799    insulin lispro (HUMALOG) injection vial 0-3 Units  0-3 Units Subcutaneous Nightly Lucas Horvath MD   Stopped at 04/28/19 2113    glucose (GLUTOSE) 40 % oral gel 15 g  15 g Oral PRN Lucas Horvath MD        dextrose 50 % solution 12.5 g  12.5 g Intravenous PRN Lucas Horvath MD        glucagon (rDNA) injection 1 mg  1 mg Intramuscular PRN Lucas Horvath MD        dextrose 5 % solution  100 mL/hr Intravenous PRN Lucas Horvath MD             Allergies  Allergies   Allergen Reactions    Ciprofloxacin Hives    Contrast [Iodides] Itching    Depacon [Valproic Acid] Swelling    Sulfa Antibiotics Itching       REVIEW OF SYSTEMS     Within above limitations. 14 point review of systems reviewed. Pertinent positive or negative as per HPI or otherwise negative per 14 point systems review. Reviewed 4/29/2019 at 8:34 AM    PHYSICAL EXAM       Blood pressure 107/61, pulse 78, temperature 97.9 °F (36.6 °C), temperature source Oral, resp. rate 16, height 5' (1.524 m), weight 170 lb (77.1 kg), SpO2 94 %, not currently breastfeeding. General - AAO x 3  Psych - Appropriate affect/speech. No agitation  Eyes - Eye lids intact. No scleral icterus  ENT - Left eye shut, unable to open. Lips wnl. External ear clear/dry/intact.  No thyromegaly on inspection  Neuro - No gross peripheral or central neuro deficits on inspection  Heart - Sinus. RRR. S1 and S2 present. No elevated JVD appreciated  Lung - Adequate air entry b/l, No crackles/wheezes appreciated  GI - Soft. No guarding/rigidity. BS+   - No CVA/suprapubic tenderness or palpable bladder distension  Skin - left ron-orbital swelling better, left facial rash wit forehead crusty lesion. Pain along the left face from shingles  MSK - muscle wasting b/l LE due to CP       LABS AND IMAGING   CBC  [unfilled]    Last 3 Hemoglobin  Lab Results   Component Value Date    HGB 8.3 04/27/2019    HGB 9.3 04/26/2019    HGB 9.6 04/02/2019     Last 3 WBC/ANC  Lab Results   Component Value Date    WBC 8.3 04/27/2019    WBC 8.6 04/26/2019    WBC 8.4 04/02/2019     No components found for: GRNLOCTYABS  Last 3 Platelets  No results found for: PLATELET  Chemistry  [unfilled]  [unfilled]  No results found for: LDH  Coagulation Studies  Lab Results   Component Value Date    INR 0.87 11/19/2014     Liver Function Studies  Lab Results   Component Value Date    ALT 11 04/26/2019    AST 11 04/26/2019    ALKPHOS 175 04/26/2019       Recent Imaging    Pacheco Brooks #[id number] ([id number]) (52 y.o. F) (Adm: 04/26/19)    [address] [place] [address] [id number]         Imaging Results (last 7 days)          Procedure Component Value Ref Range Date/Time     CT FACIAL BONES W CONTRAST [[id number]] Collected: 04/26/19 2003     Order Status: Completed Updated: 04/26/19 2010     Narrative:       EXAMINATION:  CT OF THE FACE WITH CONTRAST 4/26/2019    TECHNIQUE:  CT of the face was performed with the administration of intravenous contrast.  Multiplanar reformatted images are provided for review. Dose modulation,  iterative reconstruction, and/or weight based adjustment of the mA/kV was  utilized to reduce the radiation dose to as low as reasonably achievable. COMPARISON:  None.     HISTORY:  ORDERING SYSTEM PROVIDED HISTORY: soft tissue infection left face, likely  shingles  TECHNOLOGIST PROVIDED HISTORY:  Ordering Physician Provided Reason for Exam: soft tissue infection left face,  likely shingles  Acuity: Acute  Type of Exam: Initial  Additional signs and symptoms: no  Relevant Medical/Surgical History: 75 ml isovue 370 used    FINDINGS:  There is abnormal soft tissue stranding overlying the left orbit and  extending along the left cheek.  There is no postseptal extension evident. There is no focal fluid collection identified to suggest an abscess. The nasopharynx and oropharynx are normal in appearance.  The epiglottis is  normal in appearance.  There is mild mucosal thickening within the left  sphenoid sinus.  The paranasal sinuses and mastoid air cells are otherwise  clear.     Impression:       Findings consistent with a left preseptal cellulitis and left facial  cellulitis. No abscess identified.             Relevant labs and imaging reviewed    ASSESSMENT AND PLAN       Pre-septal cellulitis  Herpes zoster left facial infection  Herpes ophthalmic eye infection with blindness  - ED d/w ophthalmology to assist  - IV vanco, trend level, pharmacy to assist  Levels coming down.  Redosed IV vanco and recheck trough in a.m  - IV acyclovir  - IVF ppx while on acyclovir  - eye drops  - continue to slowly improve, continue to follow clinical course, requiring intensive IV antibiotics     Seizure     DMII   - hold oral med  - ISS for now     Acute facial pain  - continue neurontin  - add pamelor  - add MSIR 15 prn for acute pain      Chronic pain in b/l LE from CP  Cerebral palsy     Wheelchair bound - functional quadriplegia     Lives in [facility] ppx          [address] [facility], Internal Medicine  4/29/2019 at 8:34 AM

## 2019-04-30 NOTE — CONSULTS
Consult completed. Procedure/rationale explained to pt & consent obtained. #20ga 45mm-long PIV initiated to RUE forearm using sterile, UltraSound-guided technique. Sterile dressing with SkinPrep and StatLock Securing Device applied. Dr. Estrella Cummins, Primary RN, notified.

## 2019-04-30 NOTE — DISCHARGE SUMMARY
No guarding/rigidity. BS+   - No CVA/suprapubic tenderness or palpable bladder distension  Skin - left ron-orbital swelling better, left facial rash with forehead crusty lesion that is improving. Pain along the left face from shingles  MSK - muscle wasting b/l LE due to CP         Significant Diagnostic Studies:   CBC:   No results for input(s): WBC, HGB, PLT in the last 72 hours. WBC   Date/Time Value Ref Range Status   04/27/2019 10:18 AM 8.3 4.0 - 10.5 K/CU MM Final   04/26/2019 05:53 PM 8.6 4.0 - 10.5 K/CU MM Final   04/02/2019 05:00 AM 8.4 4.0 - 10.5 K/CU MM Final     Hemoglobin   Date/Time Value Ref Range Status   04/27/2019 10:18 AM 8.3 (L) 12.5 - 16.0 GM/DL Final   04/26/2019 05:53 PM 9.3 (L) 12.5 - 16.0 GM/DL Final   04/02/2019 05:00 AM 9.6 (L) 12.5 - 16.0 GM/DL Final     Platelets   Date/Time Value Ref Range Status   04/27/2019 10:18  140 - 440 K/CU MM Final   04/26/2019 05:53  140 - 440 K/CU MM Final   04/02/2019 05:00  (H) 140 - 440 K/CU MM Final    CMP:  Recent Labs     04/28/19  0728 04/29/19  0602 04/30/19  0920   * 133* 129*   K 3.8 3.7 3.7   CL 95* 98* 93*   CO2 24 24 22   BUN 14 15 14   CREATININE 0.7 0.7 0.7   CALCIUM 8.3 8.6 8.5     Troponin: No results for input(s): TROPONINI in the last 72 hours. BNP: No results for input(s): BNP in the last 72 hours. Lipids: No results for input(s): CHOL, HDL in the last 72 hours. Invalid input(s): LDLCALCU  ABGs:No results for input(s): PH, NAT9QAC, PO2ART, BE, AJE4RJX, CO2CT, O2SAT, LABCARB in the last 72 hours. Invalid input(s): METHGBART    Glucose:   Recent Labs     04/29/19  1726 04/29/19  2048 04/30/19  0737   POCGLU 102* 150* 127*     Magnesium: No results for input(s): MG in the last 72 hours. Phosphorus:No results for input(s): PHOS in the last 72 hours. INR: No results for input(s): INR in the last 72 hours.       Patient Instructions:   Mratin White Medication Instructions Q:[id number]    Printed on: 04/30/19 1036   Medication Information                      acetaminophen (TYLENOL) 325 MG tablet  Take 650 mg by mouth every 4 hours as needed for Pain             acyclovir (ZOVIRAX) 800 MG tablet  Take 1 tablet by mouth 5 times daily for 10 days             amoxicillin-clavulanate (AUGMENTIN) 875-125 MG per tablet  Take 1 tablet by mouth 2 times daily for 7 days             atorvastatin (LIPITOR) 20 MG tablet  Take 20 mg by mouth nightly              bacitracin 500 UNIT/GM ophthalmic ointment  Every 4 hours for 7 days             baclofen (LIORESAL) 10 MG tablet  Take 10 mg by mouth 2 times daily             butalbital-acetaminophen-caffeine (FIORICET, ESGIC) -40 MG per tablet  Take 1 tablet by mouth every 6 hours as needed for Headaches or Migraine             calcium-vitamin D (OSCAL 500/200 D-3) 500-200 MG-UNIT per tablet  Take 1 tablet by mouth 3 times daily             cholestyramine (QUESTRAN) 4 G packet  Take 1 packet by mouth every 12 hours as needed (diarrhea)             cilostazol (PLETAL) 100 MG tablet  Take 100 mg by mouth 2 times daily             clindamycin (CLEOCIN) 300 MG capsule  Take 1 capsule by mouth 4 times daily for 7 days             docusate sodium (COLACE) 100 MG capsule  Take 100 mg by mouth 2 times daily             famotidine (PEPCID) 10 MG tablet  Take 10 mg by mouth 2 times daily. gabapentin (NEURONTIN) 300 MG capsule  Take 300 mg by mouth 2 times daily             HYDROcodone-acetaminophen (NORCO) 5-325 MG per tablet  Take 1 tablet by mouth every 4 hours as needed for Pain for up to 8 doses.              Lactobacillus (PROBIOTIC ACIDOPHILUS) TABS  Take 1 tablet by mouth 2 times daily             levETIRAcetam (KEPPRA) 1000 MG tablet  Take 1 tablet by mouth 2 times daily             loperamide (IMODIUM) 2 MG capsule  Take 2 mg by mouth as needed for Diarrhea             metFORMIN (GLUCOPHAGE) 500 MG tablet  Take 1 tablet by mouth daily (with breakfast) midodrine (PROAMATINE) 10 MG tablet  Take 1 tablet by mouth 3 times daily as needed (prn for SBP < 90)             nortriptyline (PAMELOR) 50 MG capsule  Take 1 capsule by mouth nightly             ondansetron (ZOFRAN-ODT) 4 MG disintegrating tablet  Take 4 mg by mouth every 8 hours as needed for Nausea or Vomiting.              oxybutynin (DITROPAN) 5 MG tablet  Take 5 mg by mouth 3 times daily             promethazine (PHENERGAN) 25 MG tablet  Take 25 mg by mouth every 6 hours as needed for Nausea             senna (SENOKOT) 8.6 MG tablet  Take 2 tablets by mouth 2 times daily             sertraline (ZOLOFT) 100 MG tablet  Take 75 mg by mouth daily             torsemide (DEMADEX) 20 MG tablet  Take 1 tablet by mouth every other day             vitamin D (CHOLECALCIFEROL) 5000 units CAPS capsule  Take 5,000 Units by mouth daily             zonisamide (ZONEGRAN) 100 MG capsule  Take 400 mg by mouth nightly Indications: 3 caps po daily                   Code Status: Full Code     Consults:   IP CONSULT TO OPHTHALMOLOGY  PHARMACY TO DOSE VANCOMYCIN  IP CONSULT TO HOSPITALIST  PHARMACY TO DOSE VANCOMYCIN  IP CONSULT TO IV TEAM    Diet: diabetic carb control    Activity: activity as tolerated   Work:    Discharged Condition: fair    Prognosis: Fair    Disposition: SNF      Follow-up with     Follow-up With  Details  Why  Contact Yoanna Shafer DO  Schedule an appointment as soon as possible for a visit in 2 weeks  for post neuralgia pain symptoms  [address] [facility] [address]      [facility]  [phone]   Irean Cranker, MD  Schedule an appointment as soon as possible for a visit in 1 week  call to follow up with Dr Kurtis Allen in 1 week for opthalmology review of eye infection  [place] Delgado [address] [address]  [phone]            Discharge Physician Signed:   [facility], Internal medicine  4/30/2019 at 10:36 AM    The patient was seen and examined on day of discharge and this discharge summary is in conjunction with any daily progress note from day of discharge.   Time spent on discharge in the examination, evaluation, counseling and review of medications and discharge plan: <30 minutes    Please forward this discharge summary to patient's PCP

## 2019-04-30 NOTE — CARE COORDINATION
AMAURI noted Pt has a discharge for today. Transportation arranged through [employer]  for a 4:15  time. Packet prepared. RN and SNF notified of discharge time. Pt is ready for discharge from CM standpoint.      Electronically signed by AMAURI Huber on 4/30/2019 at 3:17 PM

## 2019-04-30 NOTE — PROGRESS NOTES
IV access lost. This nurse attempted x3 to insert IV, unsuccessfully. This nurse did report to [employer] the attempts to replace IV.

## 2019-04-30 NOTE — PLAN OF CARE
Problem: Pain:  Goal: Pain level will decrease  Description  Pain level will decrease  Outcome: Completed  Goal: Control of acute pain  Description  Control of acute pain  Outcome: Completed  Goal: Control of chronic pain  Description  Control of chronic pain  Outcome: Completed     Problem: Risk for Impaired Skin Integrity  Goal: Tissue integrity - skin and mucous membranes  Description  Structural intactness and normal physiological function of skin and  mucous membranes.   Outcome: Completed     Problem: Falls - Risk of:  Goal: Will remain free from falls  Description  Will remain free from falls  Outcome: Completed  Goal: Absence of physical injury  Description  Absence of physical injury  Outcome: Completed

## 2019-09-27 PROBLEM — D50.0 ANEMIA, BLOOD LOSS: Status: ACTIVE | Noted: 2019-01-01

## 2019-09-27 PROBLEM — D64.9 ANEMIA: Status: ACTIVE | Noted: 2019-01-01

## 2019-09-27 PROBLEM — M54.50 LOW BACK PAIN: Status: ACTIVE | Noted: 2019-01-01

## 2019-10-02 PROBLEM — C79.9 METASTATIC DISEASE (HCC): Status: ACTIVE | Noted: 2019-01-01
